# Patient Record
Sex: FEMALE | Race: WHITE | Employment: UNEMPLOYED | URBAN - METROPOLITAN AREA
[De-identification: names, ages, dates, MRNs, and addresses within clinical notes are randomized per-mention and may not be internally consistent; named-entity substitution may affect disease eponyms.]

---
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
## 2023-02-16 ENCOUNTER — TELEMEDICINE (OUTPATIENT)
Dept: BEHAVIORAL/MENTAL HEALTH CLINIC | Age: 18
End: 2023-02-16

## 2023-02-16 DIAGNOSIS — F43.10 COMPLEX POSTTRAUMATIC STRESS DISORDER: Primary | ICD-10-CM

## 2023-02-16 DIAGNOSIS — F32.9 MAJOR DEPRESSIVE DISORDER WITHOUT PSYCHOTIC FEATURES: ICD-10-CM

## 2023-02-16 DIAGNOSIS — F33.1 MDD (MAJOR DEPRESSIVE DISORDER), RECURRENT EPISODE, MODERATE (HCC): ICD-10-CM

## 2023-02-16 RX ORDER — ESCITALOPRAM OXALATE 10 MG/1
10 TABLET ORAL DAILY
Qty: 30 TABLET | Refills: 2 | Status: SHIPPED | OUTPATIENT
Start: 2023-02-16

## 2023-02-16 ASSESSMENT — ANXIETY QUESTIONNAIRES
7. FEELING AFRAID AS IF SOMETHING AWFUL MIGHT HAPPEN: 0
3. WORRYING TOO MUCH ABOUT DIFFERENT THINGS: 0
5. BEING SO RESTLESS THAT IT IS HARD TO SIT STILL: 0
2. NOT BEING ABLE TO STOP OR CONTROL WORRYING: 0
1. FEELING NERVOUS, ANXIOUS, OR ON EDGE: 0
GAD7 TOTAL SCORE: 0
4. TROUBLE RELAXING: 0
6. BECOMING EASILY ANNOYED OR IRRITABLE: 0

## 2023-02-16 ASSESSMENT — PATIENT HEALTH QUESTIONNAIRE - PHQ9
SUM OF ALL RESPONSES TO PHQ QUESTIONS 1-9: 0
2. FEELING DOWN, DEPRESSED OR HOPELESS: 0
SUM OF ALL RESPONSES TO PHQ QUESTIONS 1-9: 0
SUM OF ALL RESPONSES TO PHQ QUESTIONS 1-9: 0
1. LITTLE INTEREST OR PLEASURE IN DOING THINGS: 0
SUM OF ALL RESPONSES TO PHQ9 QUESTIONS 1 & 2: 0
SUM OF ALL RESPONSES TO PHQ QUESTIONS 1-9: 0
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
## 2023-03-07 ENCOUNTER — HOSPITAL ENCOUNTER (EMERGENCY)
Age: 18
Discharge: HOME | End: 2023-03-07
Attending: EMERGENCY MEDICINE
Payer: MEDICAID

## 2023-03-07 VITALS
SYSTOLIC BLOOD PRESSURE: 123 MMHG | TEMPERATURE: 100 F | HEART RATE: 94 BPM | RESPIRATION RATE: 16 BRPM | OXYGEN SATURATION: 95 % | DIASTOLIC BLOOD PRESSURE: 75 MMHG

## 2023-03-07 DIAGNOSIS — J02.0 ACUTE STREPTOCOCCAL PHARYNGITIS: Primary | ICD-10-CM

## 2023-03-07 LAB
FLUAV RNA SPEC QL NAA+PROBE: NOT DETECTED
FLUBV RNA SPEC QL NAA+PROBE: NOT DETECTED
SARS-COV-2 RDRP RESP QL NAA+PROBE: NOT DETECTED
SOURCE: NORMAL
STREP, MOLECULAR: NOT DETECTED

## 2023-03-07 PROCEDURE — 6360000002 HC RX W HCPCS

## 2023-03-07 PROCEDURE — 96372 THER/PROPH/DIAG INJ SC/IM: CPT

## 2023-03-07 PROCEDURE — 6370000000 HC RX 637 (ALT 250 FOR IP)

## 2023-03-07 PROCEDURE — 87651 STREP A DNA AMP PROBE: CPT

## 2023-03-07 PROCEDURE — 87502 INFLUENZA DNA AMP PROBE: CPT

## 2023-03-07 PROCEDURE — 99284 EMERGENCY DEPT VISIT MOD MDM: CPT

## 2023-03-07 PROCEDURE — 87635 SARS-COV-2 COVID-19 AMP PRB: CPT

## 2023-03-07 RX ORDER — DEXAMETHASONE SODIUM PHOSPHATE 10 MG/ML
10 INJECTION INTRAMUSCULAR; INTRAVENOUS
Status: COMPLETED | OUTPATIENT
Start: 2023-03-07 | End: 2023-03-07

## 2023-03-07 RX ORDER — DEXAMETHASONE 6 MG/1
6 TABLET ORAL DAILY
Qty: 4 TABLET | Refills: 0 | Status: SHIPPED | OUTPATIENT
Start: 2023-03-07 | End: 2023-03-11

## 2023-03-07 RX ORDER — PENICILLIN V POTASSIUM 500 MG/1
500 TABLET ORAL 3 TIMES DAILY
Qty: 30 TABLET | Refills: 0 | Status: SHIPPED | OUTPATIENT
Start: 2023-03-07 | End: 2023-03-17

## 2023-03-07 RX ORDER — KETOROLAC TROMETHAMINE 30 MG/ML
30 INJECTION, SOLUTION INTRAMUSCULAR; INTRAVENOUS ONCE
Status: COMPLETED | OUTPATIENT
Start: 2023-03-07 | End: 2023-03-07

## 2023-03-07 RX ORDER — PENICILLIN V POTASSIUM 250 MG/1
500 TABLET ORAL
Status: COMPLETED | OUTPATIENT
Start: 2023-03-07 | End: 2023-03-07

## 2023-03-07 RX ADMIN — KETOROLAC TROMETHAMINE 30 MG: 30 INJECTION, SOLUTION INTRAMUSCULAR; INTRAVENOUS at 23:05

## 2023-03-07 RX ADMIN — DEXAMETHASONE SODIUM PHOSPHATE 10 MG: 10 INJECTION INTRAMUSCULAR; INTRAVENOUS at 23:05

## 2023-03-07 RX ADMIN — PENICILLIN V POTASIUM 500 MG: 250 TABLET ORAL at 23:05

## 2023-03-07 ASSESSMENT — ENCOUNTER SYMPTOMS
ABDOMINAL PAIN: 0
COLOR CHANGE: 0
NAUSEA: 1
SORE THROAT: 1
SHORTNESS OF BREATH: 0
DIARRHEA: 0
VOMITING: 0
TROUBLE SWALLOWING: 0

## 2023-03-08 NOTE — ED PROVIDER NOTES
Emergency Department Provider Note                   PCP:                Aung Gaitan MD               Age: 21 y.o. Sex: female     DISPOSITION Decision To Discharge 03/07/2023 10:10:22 PM       ICD-10-CM    1. Acute streptococcal pharyngitis  J02.0 penicillin v potassium (VEETID) 500 MG tablet     benzocaine-menthol (CEPACOL) 15-4 MG LOZG lozenge     dexamethasone (DECADRON) 6 MG tablet          MEDICAL DECISION MAKING  Complexity of Problems Addressed:  1 acute illness    Data Reviewed and Analyzed:  Category 1:   I reviewed records from an external source: provider visit notes from PCP. I reviewed records from an external source: provider visit notes from outside specialist.  I ordered each unique test.  I reviewed the results of each unique test.        Category 2:       I independently ordered and reviewed the labs    Category 3: Discussion of management or test interpretation. 78-year-old female presents for 2 days of fever, sore throat. States fevers are reducing with Tylenol. Denies any changes to phonation, inability to handle secretions. Patient with a temperature of 100 in triage with a heart rate of 111. Patient had just taken Tylenol 30 minutes prior to arrival.  We will recheck heart rate is likely elevation is due to elevated temperature. Rapid COVID-19, rapid influenza and rapid strep throat were obtained and were all negative    On physical exam patient has tonsillar edema with significant tonsillar exudate. Uvula is midline and she does have anterior chain cervical adenopathy. Although patient tested negative for strep pharyngitis, will treat her for strep pharyngitis with a course of penicillin. Patient's urine hCG is negative  Patient agreeable to IM injection of Toradol and IM injection of Decadron    Based on history, physical exam, work-up today in the emergency department, I do not feel additional lab work or imaging is warranted at this time.   No urgent or emergent findings. Patient is stable and can be discharged home with follow-up to primary care. Uvula is midline, normal phonation, no indication of peritonsillar abscess at this time. Patient does have a primary care provider with whom she can follow-up with as well. Patient will be discharged with a course of penicillin, Decadron and Cepacol lozenges    I discussed physical exam findings, treatment, follow-up with patient. Answered any questions that she had. Discussed signs and symptoms that would warrant a prompt return to the emergency department included these in discharge paperwork as well    Patient discharged home in stable condition she is to follow-up with primary care towards the end of the week/beginning of next week. History was obtained from the patient  Patient very involved in shared decision-making  Ordered and reviewed lab work today  No indication for imaging  No indication for EKG      ED Course as of 03/07/23 2247   Tue Mar 07, 2023   2130 Urine hCG negative [JG]    Rapid influenza negative  Rapid COVID-19 negative  Rapid strep negative [JG]      ED Course User Index  [JG] CHAIM Rachel       Risk of Complications and/or Morbidity of Patient Management:  Prescription drug management performed and Patient was discharged risks and benefits of hospitalization were considered     Is this patient to be included in the SEP-1 core measure due to severe sepsis or septic shock? No Exclusion criteria - the patient is NOT to be included for SEP-1 Core Measure due to: 2+ SIRS criteria are not met     Garcia Hoffman is a 21 y.o. female who presents to the Emergency Department with chief complaint of  No chief complaint on file. 75-year-old female with history of mild intermittent asthma,  section presents to the emergency department today with chief complaint of 2 days of sore throat with intermittent nausea. Patient states fever began yesterday. Endorses ear fullness. Denies ear pain, cough, vomiting, diarrhea, shortness of breath headache or chest pain. Patient states she has been taking Tylenol and fevers do reduce. States she is been using over-the-counter cough drops without sore throat relief. She has a history of recurrent strep throat. The history is provided by the patient. No  was used. Review of Systems   Constitutional:  Positive for fever. Negative for chills and fatigue. HENT:  Positive for sore throat. Negative for congestion, drooling, ear pain and trouble swallowing. Respiratory:  Negative for shortness of breath. Cardiovascular:  Negative for chest pain and palpitations. Gastrointestinal:  Positive for nausea. Negative for abdominal pain, diarrhea and vomiting. Skin:  Negative for color change. Neurological:  Negative for weakness and headaches. All other systems reviewed and are negative. Vitals signs and nursing note reviewed. Patient Vitals for the past 4 hrs:   Temp Pulse Resp BP SpO2   03/07/23 2208 -- 94 -- -- --   03/07/23 2118 100 °F (37.8 °C) (!) 111 16 123/75 95 %          Physical Exam  Vitals and nursing note reviewed. Constitutional:       General: She is not in acute distress. Appearance: Normal appearance. She is obese. She is not ill-appearing, toxic-appearing or diaphoretic. HENT:      Head: Atraumatic. Right Ear: Tympanic membrane, ear canal and external ear normal.      Left Ear: Tympanic membrane, ear canal and external ear normal.      Nose: Nose normal.      Mouth/Throat:      Lips: Pink. Mouth: Mucous membranes are moist.      Tongue: No lesions. Tongue does not deviate from midline. Palate: No mass and lesions. Pharynx: Uvula midline. Oropharyngeal exudate and posterior oropharyngeal erythema present. No pharyngeal swelling or uvula swelling. Tonsils: Tonsillar exudate and tonsillar abscess present. 2+ on the right. 2+ on the left.    Eyes:      Extraocular Movements: Extraocular movements intact. Conjunctiva/sclera: Conjunctivae normal.   Neck:      Trachea: Trachea and phonation normal. No abnormal tracheal secretions. Cardiovascular:      Rate and Rhythm: Normal rate. Pulses: Normal pulses. Heart sounds: Normal heart sounds. Pulmonary:      Effort: Pulmonary effort is normal.      Breath sounds: Normal breath sounds. Abdominal:      General: Bowel sounds are normal.      Palpations: Abdomen is soft. Tenderness: There is no abdominal tenderness. There is no guarding or rebound. Musculoskeletal:         General: Normal range of motion. Cervical back: Normal range of motion and neck supple. Right lower leg: No edema. Lymphadenopathy:      Cervical: Cervical adenopathy present. Skin:     General: Skin is warm and dry. Capillary Refill: Capillary refill takes less than 2 seconds. Neurological:      General: No focal deficit present. Mental Status: She is alert and oriented to person, place, and time. Psychiatric:         Mood and Affect: Mood normal.         Behavior: Behavior normal.         Thought Content:  Thought content normal.         Judgment: Judgment normal.        Procedures    ED Course as of 03/07/23 2247   Tue Mar 07, 2023   2130 Urine hCG negative [JG]   2208 Rapid influenza negative  Rapid COVID-19 negative  Rapid strep negative [JG]      ED Course User Index  [JG] CHAIM King        Orders Placed This Encounter   Procedures    Influenza A/B, Molecular    COVID-19, Rapid    Group A Strep Screen By PCR        Medications   ketorolac (TORADOL) injection 30 mg (has no administration in time range)   dexamethasone (DECADRON) injection 10 mg (has no administration in time range)   penicillin v potassium (VEETID) tablet 500 mg (has no administration in time range)       New Prescriptions    BENZOCAINE-MENTHOL (CEPACOL) 15-4 MG LOZG LOZENGE    Take 1 lozenge by mouth 6 times daily for 5 days DEXAMETHASONE (DECADRON) 6 MG TABLET    Take 1 tablet by mouth daily for 4 days    PENICILLIN V POTASSIUM (VEETID) 500 MG TABLET    Take 1 tablet by mouth 3 times daily for 10 days        Past Medical History:   Diagnosis Date    Allergic rhinitis     Anemia, antepartum 2020    Anxiety     Asthma, intermittent     Deliberate self-cutting     Depression     Encounter for immunization 2020    History of shoulder dystocia in prior pregnancy     HSV-1 infection     Hypertension     Inhalation injury 2018    Insomnia     Lactose intolerance     Oppositional defiant disorder     Joanna Alves    Orthostatic hypotension     Pre-eclampsia 2020    with first pregnancy    Rh negative state in antepartum period     Shoulder dystocia during labor and delivery         Past Surgical History:   Procedure Laterality Date     SECTION  2021    HEENT      Foreign body removal from right eye        Family History   Problem Relation Age of Onset    Migraines Maternal Aunt     Diabetes Maternal Aunt     Hypertension Maternal Aunt     Diabetes Brother     Diabetes Maternal Grandmother     Hypertension Maternal Grandmother     Diabetes Maternal Grandfather     High Cholesterol Other     Stroke Other     Heart Disease Other     Diabetes Father     Hypertension Maternal Grandfather     Coronary Art Dis Other         Social History     Socioeconomic History    Marital status: Single     Spouse name: None    Number of children: None    Years of education: None    Highest education level: None   Tobacco Use    Smoking status: Every Day     Packs/day: 1.00     Types: Cigarettes    Smokeless tobacco: Never   Vaping Use    Vaping Use: Never used   Substance and Sexual Activity    Alcohol use: Yes     Comment: ocassionally    Drug use: Yes     Types: Marijuana Fred Mcgee        Allergies:  Other    Previous Medications    CLOTRIMAZOLE-BETAMETHASONE (LOTRISONE) 1-0.05 % CREAM    Apply topically 2 times daily. ESCITALOPRAM (LEXAPRO) 10 MG TABLET    Take 1 tablet by mouth daily    ESTRADIOL (ESTRACE) 1 MG TABLET    1 mg sublinqual and/or po qd x 4-8 weeks for Nexplanon BTB. Discontinue Estrogen after 1-2 m of amenorrhea to re-assess bleeding pattern. ETONOGESTREL (NEXPLANON) 68 MG IMPLANT    68 mg by Subdermal route once    HYDROCORTISONE (PROCTOZONE-HC) 2.5 % CREA RECTAL CREAM    Place rectally 2 times daily    MELATONIN 3 MG TABS TABLET    Take 3 mg by mouth daily    NONFORMULARY        NONFORMULARY        TRIAMCINOLONE (KENALOG) 0.025 % OINTMENT    Apply topically 2 times daily. Results for orders placed or performed during the hospital encounter of 03/07/23   Influenza A/B, Molecular    Specimen: Nasal   Result Value Ref Range    Influenza A, TY Not detected      Influenza B, TY Not detected     COVID-19, Rapid    Specimen: Nasopharyngeal   Result Value Ref Range    Source Nasopharyngeal      SARS-CoV-2, Rapid Not detected NOTD     Group A Strep Screen By PCR    Specimen: Throat   Result Value Ref Range    Strep, Molecular Not detected          No orders to display                     Voice dictation software was used during the making of this note. This software is not perfect and grammatical and other typographical errors may be present. This note has not been completely proofread for errors.       Nathalie Luciano, [address]  03/07/23 0285

## 2023-03-08 NOTE — ED NOTES
I have reviewed discharge instructions with the patient. The patient verbalized understanding. Patient left ED via Discharge Method: ambulatory to Home with self. Opportunity for questions and clarification provided. Patient given 3 scripts. To continue your aftercare when you leave the hospital, you may receive an automated call from our care team to check in on how you are doing. This is a free service and part of our promise to provide the best care and service to meet your aftercare needs.  If you have questions, or wish to unsubscribe from this service please call [phone]. Thank you for Choosing our [address] [place] [address] Emergency Department.         Halle Wray RN  03/07/23 8245

## 2023-03-08 NOTE — DISCHARGE INSTRUCTIONS
You were evaluated in the emergency department today for sore throat    Physical exam is consistent with strep pharyngitis  you were given a dose of penicillin here in the emergency department  You are given injection of Toradol which is an anti-inflammatory here in the emergency department   you were given a dose of steroid here in the emergency department    I have written you a prescription for penicillin to be started tomorrow  I have written you prescription for steroid to be started tomorrow  I have written you a prescription for lozenges will help with sore throat    Contact your primary care provider tomorrow to schedule follow-up towards the end of the week beginning of next week    Return to the emergency department if you have been on antibiotics for 3 days and fevers or not reducing with Tylenol or Motrin, you are unable to handle your secretions (drooling)    Recommend alternating Tylenol with Motrin every 4 hours for fever reduction and pain relief

## 2023-04-04 ENCOUNTER — APPOINTMENT (OUTPATIENT)
Dept: GENERAL RADIOLOGY | Age: 18
End: 2023-04-04
Payer: MEDICAID

## 2023-04-04 ENCOUNTER — HOSPITAL ENCOUNTER (EMERGENCY)
Age: 18
Discharge: HOME | End: 2023-04-04
Attending: EMERGENCY MEDICINE
Payer: MEDICAID

## 2023-04-04 ENCOUNTER — APPOINTMENT (OUTPATIENT)
Dept: CT IMAGING | Age: 18
End: 2023-04-04
Payer: MEDICAID

## 2023-04-04 VITALS
OXYGEN SATURATION: 96 % | SYSTOLIC BLOOD PRESSURE: 133 MMHG | HEIGHT: 65 IN | BODY MASS INDEX: 28.32 KG/M2 | WEIGHT: 170 LBS | RESPIRATION RATE: 18 BRPM | HEART RATE: 109 BPM | TEMPERATURE: 98.5 F | DIASTOLIC BLOOD PRESSURE: 79 MMHG

## 2023-04-04 DIAGNOSIS — S90.32XA CONTUSION OF LEFT FOOT, INITIAL ENCOUNTER: ICD-10-CM

## 2023-04-04 DIAGNOSIS — S00.83XA FACIAL CONTUSION, INITIAL ENCOUNTER: ICD-10-CM
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
## 2023-04-04 DIAGNOSIS — S16.1XXA CERVICAL STRAIN, ACUTE, INITIAL ENCOUNTER: ICD-10-CM

## 2023-04-04 PROCEDURE — 72125 CT NECK SPINE W/O DYE: CPT

## 2023-04-04 PROCEDURE — 99284 EMERGENCY DEPT VISIT MOD MDM: CPT

## 2023-04-04 PROCEDURE — 70486 CT MAXILLOFACIAL W/O DYE: CPT

## 2023-04-04 PROCEDURE — 73630 X-RAY EXAM OF FOOT: CPT

## 2023-04-04 PROCEDURE — 6370000000 HC RX 637 (ALT 250 FOR IP): Performed by: PHYSICIAN ASSISTANT

## 2023-04-04 RX ORDER — METHOCARBAMOL 500 MG/1
500 TABLET, FILM COATED ORAL 3 TIMES DAILY PRN
Qty: 40 TABLET | Refills: 0 | Status: SHIPPED | OUTPATIENT
Start: 2023-04-04 | End: 2023-04-14

## 2023-04-04 RX ORDER — LIDOCAINE 4 G/G
1 PATCH TOPICAL
Status: DISCONTINUED | OUTPATIENT
Start: 2023-04-04 | End: 2023-04-04 | Stop reason: HOSPADM

## 2023-04-04 RX ORDER — BUTALBITAL, ACETAMINOPHEN AND CAFFEINE 50; 325; 40 MG/1; MG/1; MG/1
2 TABLET ORAL
Status: COMPLETED | OUTPATIENT
Start: 2023-04-04 | End: 2023-04-04

## 2023-04-04 RX ORDER — DICLOFENAC POTASSIUM 50 MG/1
50 TABLET, FILM COATED ORAL 3 TIMES DAILY PRN
Qty: 30 TABLET | Refills: 0 | Status: SHIPPED | OUTPATIENT
Start: 2023-04-04 | End: 2023-09-13

## 2023-04-04 RX ADMIN — BUTALBITAL, ACETAMINOPHEN, AND CAFFEINE 2 TABLET: 50; 325; 40 TABLET ORAL at 20:17

## 2023-04-04 ASSESSMENT — PAIN - FUNCTIONAL ASSESSMENT: PAIN_FUNCTIONAL_ASSESSMENT: 0-10

## 2023-04-04 ASSESSMENT — PAIN SCALES - GENERAL: PAINLEVEL_OUTOF10: 3

## 2023-04-12 ENCOUNTER — TELEMEDICINE (OUTPATIENT)
Dept: BEHAVIORAL/MENTAL HEALTH CLINIC | Age: 18
End: 2023-04-12

## 2023-04-12 DIAGNOSIS — F43.10 COMPLEX POSTTRAUMATIC STRESS DISORDER: Primary | ICD-10-CM

## 2023-04-12 DIAGNOSIS — F33.1 MDD (MAJOR DEPRESSIVE DISORDER), RECURRENT EPISODE, MODERATE (HCC): ICD-10-CM

## 2023-04-12 ASSESSMENT — PATIENT HEALTH QUESTIONNAIRE - PHQ9
4. FEELING TIRED OR HAVING LITTLE ENERGY: 3
5. POOR APPETITE OR OVEREATING: 0
1. LITTLE INTEREST OR PLEASURE IN DOING THINGS: 0
SUM OF ALL RESPONSES TO PHQ QUESTIONS 1-9: 9
SUM OF ALL RESPONSES TO PHQ QUESTIONS 1-9: 9
SUM OF ALL RESPONSES TO PHQ9 QUESTIONS 1 & 2: 0
SUM OF ALL RESPONSES TO PHQ QUESTIONS 1-9: 9
6. FEELING BAD ABOUT YOURSELF - OR THAT YOU ARE A FAILURE OR HAVE LET YOURSELF OR YOUR FAMILY DOWN: 0
8. MOVING OR SPEAKING SO SLOWLY THAT OTHER PEOPLE COULD HAVE NOTICED. OR THE OPPOSITE, BEING SO FIGETY OR RESTLESS THAT YOU HAVE BEEN MOVING AROUND A LOT MORE THAN USUAL: 0
2. FEELING DOWN, DEPRESSED OR HOPELESS: 0
7. TROUBLE CONCENTRATING ON THINGS, SUCH AS READING THE NEWSPAPER OR WATCHING TELEVISION: 3
9. THOUGHTS THAT YOU WOULD BE BETTER OFF DEAD, OR OF HURTING YOURSELF: 0
3. TROUBLE FALLING OR STAYING ASLEEP: 3
SUM OF ALL RESPONSES TO PHQ QUESTIONS 1-9: 9
10. IF YOU CHECKED OFF ANY PROBLEMS, HOW DIFFICULT HAVE THESE PROBLEMS MADE IT FOR YOU TO DO YOUR WORK, TAKE CARE OF THINGS AT HOME, OR GET ALONG WITH OTHER PEOPLE: 1

## 2023-04-28 ENCOUNTER — HOSPITAL ENCOUNTER (EMERGENCY)
Age: 18
Discharge: HOME | End: 2023-04-28
Attending: EMERGENCY MEDICINE
Payer: MEDICAID

## 2023-04-28 VITALS
HEIGHT: 65 IN | WEIGHT: 175 LBS | RESPIRATION RATE: 16 BRPM | OXYGEN SATURATION: 97 % | HEART RATE: 93 BPM | SYSTOLIC BLOOD PRESSURE: 129 MMHG | TEMPERATURE: 98.6 F | BODY MASS INDEX: 29.16 KG/M2 | DIASTOLIC BLOOD PRESSURE: 85 MMHG

## 2023-04-28 DIAGNOSIS — N76.0 BACTERIAL VAGINOSIS: Primary | ICD-10-CM

## 2023-04-28 DIAGNOSIS — B96.89 BACTERIAL VAGINOSIS: Primary | ICD-10-CM

## 2023-04-28 LAB
SERVICE CMNT-IMP: NORMAL
WET PREP GENITAL: NORMAL

## 2023-04-28 PROCEDURE — 87491 CHLMYD TRACH DNA AMP PROBE: CPT

## 2023-04-28 PROCEDURE — 99283 EMERGENCY DEPT VISIT LOW MDM: CPT

## 2023-04-28 PROCEDURE — 87591 N.GONORRHOEAE DNA AMP PROB: CPT

## 2023-04-28 PROCEDURE — 87210 SMEAR WET MOUNT SALINE/INK: CPT

## 2023-04-28 RX ORDER — METRONIDAZOLE 500 MG/1
500 TABLET ORAL 2 TIMES DAILY
Qty: 14 TABLET | Refills: 0 | Status: SHIPPED | OUTPATIENT
Start: 2023-04-28 | End: 2023-05-05

## 2023-04-28 ASSESSMENT — LIFESTYLE VARIABLES: HOW OFTEN DO YOU HAVE A DRINK CONTAINING ALCOHOL: 2-3 TIMES A WEEK

## 2023-04-28 NOTE — ED PROVIDER NOTES
Emergency Department Provider Note       PCP: Love Merritt MD   Age: 19 y.o. Sex: female     DISPOSITION Decision To Discharge 04/28/2023 01:31:29 AM       ICD-10-CM    1. Bacterial vaginosis  N76.0     B96.89           Medical Decision Making     Complexity of Problems Addressed:  Complexity of Problem: 1 acute, uncomplicated illness or injury. Data Reviewed and Analyzed:  Category 1:   I independently ordered and reviewed each unique test.         Category 2:       Category 3: Discussion of management or test interpretation. Urinalysis is negative for evidence of infection pregnancy test is also negative. Wet prep is positive for numerous clue cells. As the patient is afebrile and normal vital signs and nontoxic in appearance will treat for bacterial vaginosis. Risk of Complications and/or Morbidity of Patient Management:      History     Arva Barthel is a 19 y.o. female who presents to the Emergency Department with chief complaint of    Chief Complaint   Patient presents with    Dysuria      Patient presents with 24 to 36-hour history of sharp stabbing suprapubic abdominal pain that radiates to the right and the left. Pain is worse with movement and better when remaining still. She reports an unusual and heavy vaginal discharge for the past 4 days. She denies dysuria or hematuria. She denies any fever or chills or flank pain. The history is provided by the patient. Physical Exam     Vitals signs and nursing note reviewed. Patient Vitals for the past 4 hrs:   Temp Pulse Resp BP SpO2   04/28/23 0025 98.6 °F (37 °C) 93 16 129/85 97 %        Physical Exam  Vitals reviewed. Constitutional:       General: She is not in acute distress. Appearance: Normal appearance. She is not ill-appearing, toxic-appearing or diaphoretic. Eyes:      Extraocular Movements: Extraocular movements intact.       Conjunctiva/sclera: Conjunctivae normal.      Pupils: Pupils are equal, round, and

## 2023-04-28 NOTE — ED NOTES
I have reviewed discharge instructions with the patient. The patient verbalized understanding. Patient left ED via Discharge Method: ambulatory to Home with family. Opportunity for questions and clarification provided. Patient given 0 scripts. Px sent to pharmacy          To continue your aftercare when you leave the hospital, you may receive an automated call from our care team to check in on how you are doing. This is a free service and part of our promise to provide the best care and service to meet your aftercare needs.  If you have questions, or wish to unsubscribe from this service please call [phone]. Thank you for Choosing our [facility] Emergency Department.         Harrison Betancorut, RN  04/28/23 [address], RN  04/28/23 1008

## 2023-05-01 LAB
C TRACH RRNA SPEC QL NAA+PROBE: NEGATIVE
N GONORRHOEA RRNA SPEC QL NAA+PROBE: NEGATIVE
SPECIMEN SOURCE: NORMAL

## 2023-09-13 ENCOUNTER — HOSPITAL ENCOUNTER (EMERGENCY)
Age: 18
Discharge: HOME | End: 2023-09-13
Attending: STUDENT IN AN ORGANIZED HEALTH CARE EDUCATION/TRAINING PROGRAM
Payer: MEDICAID

## 2023-09-13 VITALS
RESPIRATION RATE: 16 BRPM | BODY MASS INDEX: 25.83 KG/M2 | DIASTOLIC BLOOD PRESSURE: 87 MMHG | HEIGHT: 65 IN | HEART RATE: 116 BPM | TEMPERATURE: 99.1 F | OXYGEN SATURATION: 100 % | SYSTOLIC BLOOD PRESSURE: 123 MMHG | WEIGHT: 155 LBS

## 2023-09-13 DIAGNOSIS — N76.0 BACTERIAL VAGINOSIS: Primary | ICD-10-CM

## 2023-09-13 DIAGNOSIS — J06.9 UPPER RESPIRATORY TRACT INFECTION, UNSPECIFIED TYPE: ICD-10-CM

## 2023-09-13 DIAGNOSIS — B96.89 BACTERIAL VAGINOSIS: Primary | ICD-10-CM

## 2023-09-13 LAB
APPEARANCE UR: CLEAR
BACTERIA URNS QL MICRO: ABNORMAL /HPF
BILIRUB UR QL: NEGATIVE
COLOR UR: ABNORMAL
EPI CELLS #/AREA URNS HPF: ABNORMAL /HPF
FLUAV RNA SPEC QL NAA+PROBE: NOT DETECTED
FLUBV RNA SPEC QL NAA+PROBE: NOT DETECTED
GLUCOSE UR STRIP.AUTO-MCNC: NEGATIVE MG/DL
HCG UR QL: NEGATIVE
HGB UR QL STRIP: NEGATIVE
KETONES UR QL STRIP.AUTO: NEGATIVE MG/DL
LEUKOCYTE ESTERASE UR QL STRIP.AUTO: ABNORMAL
NITRITE UR QL STRIP.AUTO: NEGATIVE
PH UR STRIP: 7.5 (ref 5–9)
PROT UR STRIP-MCNC: NEGATIVE MG/DL
SARS-COV-2 RDRP RESP QL NAA+PROBE: NOT DETECTED
SERVICE CMNT-IMP: NORMAL
SOURCE: NORMAL
SP GR UR REFRACTOMETRY: 1.02 (ref 1–1.02)
STREP, MOLECULAR: NOT DETECTED
UROBILINOGEN UR QL STRIP.AUTO: 1 EU/DL (ref 0.2–1)
WBC URNS QL MICRO: ABNORMAL /HPF
WET PREP GENITAL: NORMAL

## 2023-09-13 PROCEDURE — 87502 INFLUENZA DNA AMP PROBE: CPT

## 2023-09-13 PROCEDURE — 87591 N.GONORRHOEAE DNA AMP PROB: CPT

## 2023-09-13 PROCEDURE — 99284 EMERGENCY DEPT VISIT MOD MDM: CPT

## 2023-09-13 PROCEDURE — 2580000003 HC RX 258: Performed by: STUDENT IN AN ORGANIZED HEALTH CARE EDUCATION/TRAINING PROGRAM

## 2023-09-13 PROCEDURE — 81001 URINALYSIS AUTO W/SCOPE: CPT

## 2023-09-13 PROCEDURE — 6360000002 HC RX W HCPCS: Performed by: STUDENT IN AN ORGANIZED HEALTH CARE EDUCATION/TRAINING PROGRAM

## 2023-09-13 PROCEDURE — 87635 SARS-COV-2 COVID-19 AMP PRB: CPT

## 2023-09-13 PROCEDURE — 6370000000 HC RX 637 (ALT 250 FOR IP): Performed by: STUDENT IN AN ORGANIZED HEALTH CARE EDUCATION/TRAINING PROGRAM

## 2023-09-13 PROCEDURE — 87491 CHLMYD TRACH DNA AMP PROBE: CPT

## 2023-09-13 PROCEDURE — 81025 URINE PREGNANCY TEST: CPT

## 2023-09-13 PROCEDURE — 96372 THER/PROPH/DIAG INJ SC/IM: CPT

## 2023-09-13 PROCEDURE — 87651 STREP A DNA AMP PROBE: CPT

## 2023-09-13 PROCEDURE — 87210 SMEAR WET MOUNT SALINE/INK: CPT

## 2023-09-13 RX ORDER — METRONIDAZOLE 500 MG/1
500 TABLET ORAL 2 TIMES DAILY
Qty: 14 TABLET | Refills: 0 | Status: SHIPPED | OUTPATIENT
Start: 2023-09-13 | End: 2023-09-20

## 2023-09-13 RX ORDER — DIPHENHYDRAMINE HCL 25 MG
50 CAPSULE ORAL
Status: DISCONTINUED | OUTPATIENT
Start: 2023-09-13 | End: 2023-09-13 | Stop reason: HOSPADM

## 2023-09-13 RX ORDER — DEXAMETHASONE SODIUM PHOSPHATE 10 MG/ML
10 INJECTION INTRAMUSCULAR; INTRAVENOUS ONCE
Status: DISCONTINUED | OUTPATIENT
Start: 2023-09-13 | End: 2023-09-13

## 2023-09-13 RX ORDER — LANOLIN ALCOHOL/MO/W.PET/CERES
6 CREAM (GRAM) TOPICAL
Status: COMPLETED | OUTPATIENT
Start: 2023-09-13 | End: 2023-09-13

## 2023-09-13 RX ORDER — AZITHROMYCIN 250 MG/1
2000 TABLET, FILM COATED ORAL
Status: COMPLETED | OUTPATIENT
Start: 2023-09-13 | End: 2023-09-13

## 2023-09-13 RX ORDER — CEPHALEXIN 500 MG/1
500 CAPSULE ORAL 2 TIMES DAILY
Qty: 14 CAPSULE | Refills: 0 | Status: SHIPPED | OUTPATIENT
Start: 2023-09-13 | End: 2023-09-20

## 2023-09-13 RX ORDER — IBUPROFEN 800 MG/1
800 TABLET ORAL EVERY 6 HOURS PRN
Qty: 20 TABLET | Refills: 0 | Status: SHIPPED | OUTPATIENT
Start: 2023-09-13

## 2023-09-13 RX ORDER — IBUPROFEN 800 MG/1
800 TABLET ORAL
Status: COMPLETED | OUTPATIENT
Start: 2023-09-13 | End: 2023-09-13

## 2023-09-13 RX ADMIN — AZITHROMYCIN DIHYDRATE 2000 MG: 250 TABLET ORAL at 03:41

## 2023-09-13 RX ADMIN — WATER 500 MG: 1 INJECTION INTRAMUSCULAR; INTRAVENOUS; SUBCUTANEOUS at 03:40

## 2023-09-13 RX ADMIN — IBUPROFEN 800 MG: 800 TABLET, FILM COATED ORAL at 01:23

## 2023-09-13 RX ADMIN — Medication 6 MG: at 03:57

## 2023-09-13 ASSESSMENT — LIFESTYLE VARIABLES
HOW MANY STANDARD DRINKS CONTAINING ALCOHOL DO YOU HAVE ON A TYPICAL DAY: 5 OR 6
HOW OFTEN DO YOU HAVE A DRINK CONTAINING ALCOHOL: 2-3 TIMES A WEEK

## 2023-09-13 ASSESSMENT — ENCOUNTER SYMPTOMS
SORE THROAT: 1
COUGH: 0
SHORTNESS OF BREATH: 0

## 2023-09-13 ASSESSMENT — PAIN DESCRIPTION - LOCATION: LOCATION: HEAD

## 2023-09-13 ASSESSMENT — PAIN - FUNCTIONAL ASSESSMENT: PAIN_FUNCTIONAL_ASSESSMENT: 0-10

## 2023-09-13 ASSESSMENT — PAIN SCALES - GENERAL
PAINLEVEL_OUTOF10: 5
PAINLEVEL_OUTOF10: 7

## 2023-09-13 NOTE — ED PROVIDER NOTES
Emergency Department Provider Note       PCP: Tori Bains MD   Age: 19 y.o. Sex: female     DISPOSITION Discharge - Pending Orders Complete 09/13/2023 03:23:35 AM       ICD-10-CM    1. Bacterial vaginosis  N76.0     B96.89       2. Upper respiratory tract infection, unspecified type  J06.9           Medical Decision Making     Complexity of Problems Addressed:  1 or more acute illnesses that pose a threat to life or bodily function. Data Reviewed and Analyzed:  I independently ordered and reviewed each unique test.  I reviewed external records: ED visit note from an outside group. I reviewed external records: provider visit note from PCP. I reviewed external records: provider visit note from outside specialist.   The patients assessment required an independent historian: Patient's mother at bedside. The reason they were needed is important historical information not provided by the patient. Discussion of management or test interpretation. 78-year-old female patient presenting with headache, fever, sore throat as well as concerns for vaginal discharge. We will check for flu, COVID and strep as patient denies any cough. Her lungs are clear on my assessment. No indication for x-ray. I will obtain GC and wet prep. She is requesting to perform self swabs which she has done in the past.  Urinalysis has been ordered as well. Urinalysis consistent with UTI, wet prep results show evidence of significant white cells and clue cells present. Given patient's reports of vaginal discharge following sexual intercourse, we will prophylactically treat for chlamydia and gonorrhea. Advised patient to avoid sexual contact until symptoms have resolved and antibiotics have been completed. COVID, flu and strep are negative. Patient requesting something to help her sleep. Offered Benadryl, reports history of Benadryl overdose. Will give dose of melatonin prior to discharge.       Risk of

## 2023-09-13 NOTE — DISCHARGE INSTRUCTIONS
As discussed you have been treated for the most common causes of sexually transmitted infections as a precaution. Culture results should be available for review in the next 2 to 3 days. Avoid all sexual contact until symptoms are resolved, antibiotics completed and cultures are resulted. Treat fever and headache with Tylenol or the Motrin prescribed. Take the entire course of antibiotics as prescribed. Return for worsening symptoms, concerns or questions. Avoid use of alcohol while taking Flagyl.

## 2023-09-13 NOTE — ED TRIAGE NOTES
Pt states she has had a headache with sinus pressure and fevers for approx 4 days. Has gotten worse in last 2 days.

## 2023-09-20 ENCOUNTER — HOSPITAL ENCOUNTER (EMERGENCY)
Age: 18
Discharge: HOME | End: 2023-09-20
Attending: EMERGENCY MEDICINE
Payer: MEDICAID

## 2023-09-20 VITALS
DIASTOLIC BLOOD PRESSURE: 86 MMHG | TEMPERATURE: 98.9 F | HEART RATE: 104 BPM | SYSTOLIC BLOOD PRESSURE: 111 MMHG | RESPIRATION RATE: 18 BRPM | OXYGEN SATURATION: 99 % | HEIGHT: 60 IN | WEIGHT: 155 LBS | BODY MASS INDEX: 30.43 KG/M2

## 2023-09-20 DIAGNOSIS — Z20.822 ENCOUNTER FOR LABORATORY TESTING FOR COVID-19 VIRUS: Primary | ICD-10-CM

## 2023-09-20 LAB
FLUAV RNA SPEC QL NAA+PROBE: NOT DETECTED
FLUBV RNA SPEC QL NAA+PROBE: NOT DETECTED
SARS-COV-2 RDRP RESP QL NAA+PROBE: NOT DETECTED
SOURCE: NORMAL

## 2023-09-20 PROCEDURE — 87635 SARS-COV-2 COVID-19 AMP PRB: CPT

## 2023-09-20 PROCEDURE — 99283 EMERGENCY DEPT VISIT LOW MDM: CPT

## 2023-09-20 PROCEDURE — 87502 INFLUENZA DNA AMP PROBE: CPT

## 2023-09-21 NOTE — ED PROVIDER NOTES
Capillary Refill: Capillary refill takes less than 2 seconds. Neurological:      General: No focal deficit present. Mental Status: She is alert and oriented to person, place, and time. Procedures     Procedures     Orders Placed This Encounter   Procedures    COVID-19, Rapid    Influenza A/B, Molecular        Medications given during this emergency department visit:  Medications - No data to display    New Prescriptions    No medications on file        Past Medical History:   Diagnosis Date    Allergic rhinitis     Anemia, antepartum 2020    Anxiety     Asthma, intermittent     Deliberate self-cutting     Depression     Encounter for immunization 2020    History of shoulder dystocia in prior pregnancy     HSV-1 infection     Hypertension     Inhalation injury 2018    Insomnia     Lactose intolerance     Oppositional defiant disorder     Vivienne Riojas    Orthostatic hypotension     Pre-eclampsia 2020    with first pregnancy    Rh negative state in antepartum period     Shoulder dystocia during labor and delivery         Past Surgical History:   Procedure Laterality Date     SECTION  2021    HEENT      Foreign body removal from right eye        Results for orders placed or performed during the hospital encounter of 21   COVID-19, Rapid    Specimen: Nasopharyngeal   Result Value Ref Range    Source NASAL SWAB      SARS-CoV-2, Rapid Not detected NOTD     Influenza A/B, Molecular    Specimen: Not Specified   Result Value Ref Range    Influenza A, TY Not detected NOTD      Influenza B, TY Not detected NOTD          No orders to display         Voice dictation software was used during the making of this note. This software is not perfect and grammatical and other typographical errors may be present. This note has not been completely proofread for errors.        MALLIKA Duggan - [place]  23 [phone]

## 2023-10-22 ENCOUNTER — HOSPITAL ENCOUNTER (EMERGENCY)
Age: 18
Discharge: HOME | End: 2023-10-23
Attending: EMERGENCY MEDICINE
Payer: MEDICAID

## 2023-10-22 ENCOUNTER — APPOINTMENT (OUTPATIENT)
Dept: GENERAL RADIOLOGY | Age: 18
End: 2023-10-22
Payer: MEDICAID

## 2023-10-22 VITALS
DIASTOLIC BLOOD PRESSURE: 75 MMHG | OXYGEN SATURATION: 95 % | HEART RATE: 81 BPM | SYSTOLIC BLOOD PRESSURE: 113 MMHG | RESPIRATION RATE: 18 BRPM | BODY MASS INDEX: 28.32 KG/M2 | TEMPERATURE: 98.4 F | WEIGHT: 170 LBS | HEIGHT: 65 IN

## 2023-10-22 DIAGNOSIS — K21.9 GASTROESOPHAGEAL REFLUX DISEASE WITHOUT ESOPHAGITIS: ICD-10-CM

## 2023-10-22 DIAGNOSIS — R07.89 ATYPICAL CHEST PAIN: Primary | ICD-10-CM

## 2023-10-22 LAB
ALBUMIN SERPL-MCNC: 3.7 G/DL (ref 3.5–5)
ALBUMIN/GLOB SERPL: 0.9 (ref 0.4–1.6)
ALP SERPL-CCNC: 76 U/L (ref 50–136)
ALT SERPL-CCNC: 24 U/L (ref 12–65)
ANION GAP SERPL CALC-SCNC: 7 MMOL/L (ref 2–11)
AST SERPL-CCNC: 11 U/L (ref 15–37)
BASOPHILS # BLD: 0.1 K/UL (ref 0–0.2)
BASOPHILS NFR BLD: 1 % (ref 0–2)
BILIRUB SERPL-MCNC: 0.2 MG/DL (ref 0.2–1.1)
BUN SERPL-MCNC: 14 MG/DL (ref 6–23)
CALCIUM SERPL-MCNC: 9.4 MG/DL (ref 8.3–10.4)
CHLORIDE SERPL-SCNC: 109 MMOL/L (ref 101–110)
CO2 SERPL-SCNC: 22 MMOL/L (ref 21–32)
CREAT SERPL-MCNC: 0.88 MG/DL (ref 0.6–1)
DIFFERENTIAL METHOD BLD: ABNORMAL
EOSINOPHIL # BLD: 0.5 K/UL (ref 0–0.8)
EOSINOPHIL NFR BLD: 4 % (ref 0.5–7.8)
ERYTHROCYTE [DISTWIDTH] IN BLOOD BY AUTOMATED COUNT: 12.7 % (ref 11.9–14.6)
GLOBULIN SER CALC-MCNC: 4.3 G/DL (ref 2.8–4.5)
GLUCOSE SERPL-MCNC: 106 MG/DL (ref 65–100)
HCG, URINE, POC: NEGATIVE
HCT VFR BLD AUTO: 40.4 % (ref 35.8–46.3)
HGB BLD-MCNC: 13.3 G/DL (ref 11.7–15.4)
IMM GRANULOCYTES # BLD AUTO: 0 K/UL (ref 0–0.5)
IMM GRANULOCYTES NFR BLD AUTO: 0 % (ref 0–5)
LIPASE SERPL-CCNC: 154 U/L (ref 73–393)
LYMPHOCYTES # BLD: 2.1 K/UL (ref 0.5–4.6)
LYMPHOCYTES NFR BLD: 18 % (ref 13–44)
Lab: NORMAL
MCH RBC QN AUTO: 28.1 PG (ref 26.1–32.9)
MCHC RBC AUTO-ENTMCNC: 32.9 G/DL (ref 31.4–35)
MCV RBC AUTO: 85.2 FL (ref 82–102)
MONOCYTES # BLD: 0.7 K/UL (ref 0.1–1.3)
MONOCYTES NFR BLD: 6 % (ref 4–12)
NEGATIVE QC PASS/FAIL: NORMAL
NEUTS SEG # BLD: 8 K/UL (ref 1.7–8.2)
NEUTS SEG NFR BLD: 70 % (ref 43–78)
NRBC # BLD: 0 K/UL (ref 0–0.2)
PLATELET # BLD AUTO: 326 K/UL (ref 150–450)
PMV BLD AUTO: 10.2 FL (ref 9.4–12.3)
POSITIVE QC PASS/FAIL: NORMAL
POTASSIUM SERPL-SCNC: 4.5 MMOL/L (ref 3.5–5.1)
PROT SERPL-MCNC: 8 G/DL (ref 6.3–8.2)
RBC # BLD AUTO: 4.74 M/UL (ref 4.05–5.2)
SODIUM SERPL-SCNC: 138 MMOL/L (ref 133–143)
TROPONIN I SERPL HS-MCNC: 4.4 PG/ML (ref 0–14)
WBC # BLD AUTO: 11.4 K/UL (ref 4.3–11.1)

## 2023-10-22 PROCEDURE — 71045 X-RAY EXAM CHEST 1 VIEW: CPT

## 2023-10-22 PROCEDURE — 83690 ASSAY OF LIPASE: CPT

## 2023-10-22 PROCEDURE — 85025 COMPLETE CBC W/AUTO DIFF WBC: CPT

## 2023-10-22 PROCEDURE — 99285 EMERGENCY DEPT VISIT HI MDM: CPT

## 2023-10-22 PROCEDURE — 84484 ASSAY OF TROPONIN QUANT: CPT

## 2023-10-22 PROCEDURE — 80053 COMPREHEN METABOLIC PANEL: CPT

## 2023-10-22 PROCEDURE — 6370000000 HC RX 637 (ALT 250 FOR IP): Performed by: EMERGENCY MEDICINE

## 2023-10-22 RX ORDER — MAGNESIUM HYDROXIDE/ALUMINUM HYDROXICE/SIMETHICONE 120; 1200; 1200 MG/30ML; MG/30ML; MG/30ML
30 SUSPENSION ORAL
Status: COMPLETED | OUTPATIENT
Start: 2023-10-22 | End: 2023-10-22

## 2023-10-22 RX ORDER — LIDOCAINE HYDROCHLORIDE 20 MG/ML
15 SOLUTION OROPHARYNGEAL
Status: COMPLETED | OUTPATIENT
Start: 2023-10-22 | End: 2023-10-22

## 2023-10-22 RX ADMIN — ALUMINUM HYDROXIDE, MAGNESIUM HYDROXIDE, DIMETHICONE 30 ML: 200; 200; 20 LIQUID ORAL at 23:48

## 2023-10-22 RX ADMIN — LIDOCAINE HYDROCHLORIDE 15 ML: 20 SOLUTION ORAL at 23:48

## 2023-10-22 ASSESSMENT — ENCOUNTER SYMPTOMS
VOICE CHANGE: 0
VOMITING: 0
NAUSEA: 1
BACK PAIN: 0
EYE REDNESS: 0
SHORTNESS OF BREATH: 0
COLOR CHANGE: 0
ABDOMINAL PAIN: 0
CHEST TIGHTNESS: 1

## 2023-10-22 ASSESSMENT — LIFESTYLE VARIABLES
HOW OFTEN DO YOU HAVE A DRINK CONTAINING ALCOHOL: NEVER
HOW MANY STANDARD DRINKS CONTAINING ALCOHOL DO YOU HAVE ON A TYPICAL DAY: PATIENT DOES NOT DRINK

## 2023-10-22 ASSESSMENT — PAIN SCALES - GENERAL: PAINLEVEL_OUTOF10: 6

## 2023-10-22 ASSESSMENT — PAIN - FUNCTIONAL ASSESSMENT: PAIN_FUNCTIONAL_ASSESSMENT: NONE - DENIES PAIN

## 2023-10-23 RX ORDER — CYCLOBENZAPRINE HCL 10 MG
10 TABLET ORAL 3 TIMES DAILY PRN
Qty: 13 TABLET | Refills: 0 | Status: SHIPPED | OUTPATIENT
Start: 2023-10-23 | End: 2023-11-02

## 2023-10-23 RX ORDER — OMEPRAZOLE 40 MG/1
40 CAPSULE, DELAYED RELEASE ORAL
Qty: 30 CAPSULE | Refills: 2 | Status: SHIPPED | OUTPATIENT
Start: 2023-10-23

## 2023-10-23 NOTE — ED PROVIDER NOTES
Emergency Department Provider Note       PCP: Heber Millan MD   Age: 19 y.o. Sex: female     DISPOSITION Decision To Discharge 10/23/2023 12:18:34 AM       ICD-10-CM    1. Atypical chest pain  R07.89       2. Gastroesophageal reflux disease without esophagitis  K21.9           Medical Decision Making     Complexity of Problems Addressed:  1 or more chronic illnesses with a severe exacerbation or progression. Data Reviewed and Analyzed:  I independently ordered and reviewed each unique test.  I reviewed external records: ED visit note from an outside group. I reviewed external records: provider visit note from PCP. I reviewed external records: previous EKG including cardiologist interpretation. I reviewed external records: previous lab results from outside ED. I independently interpreted the cardiac monitor rhythm strip normal sinus rhythm. I interpreted the X-rays no pneumothorax. EKG interpretation independent of cardiologist: Normal sinus rhythm, rate of 60, normal axis, no blocks, no gross ST segment elevation or depression noted. Discussion of management or test interpretation. Given age and well appearance low suspicion for any emergent pathology. Nevertheless given persistent pain will obtain labs as well as EKG. 12:20 AM EDT  Laboratory data here is reassuring. Chest x-ray is clear as well. Some improvement with GI cocktail here. Discussed with patient results of testing. Pain could be more gastrointestinal in nature. However she thinks pain is made worse with position and would mean it may be muscle skeletal in nature 2. Will place on PPI as well as Flexeril. Encourage PCP follow-up      Risk of Complications and/or Morbidity of Patient Management:  Prescription drug management performed. Shared medical decision making was utilized in creating the patients health plan today. History       Patient presents the ER with complaints of chest pain.   Patient

## 2023-10-23 NOTE — ED TRIAGE NOTES
Pt c/o pain in center of chest going towards upper abdominal area, and radiating to left breast.  The pain has been going on and off for the past 2 years. Stated was dx w/ panic attacked 2 yrs ago. Came to ER today b/c experienced chest pain while lying down today. Went away when she stood/sat up. Pt stated \"When ever it is at it's peak my left arm and left shoulder start hurting. \"

## 2023-11-01 ENCOUNTER — NURSE ONLY (OUTPATIENT)
Dept: FAMILY MEDICINE CLINIC | Facility: CLINIC | Age: 18
End: 2023-11-01
Payer: MEDICAID

## 2023-11-01 ENCOUNTER — TELEPHONE (OUTPATIENT)
Dept: FAMILY MEDICINE CLINIC | Facility: CLINIC | Age: 18
End: 2023-11-01

## 2023-11-01 DIAGNOSIS — Z23 ENCOUNTER FOR IMMUNIZATION: Primary | ICD-10-CM

## 2023-11-01 PROCEDURE — 90471 IMMUNIZATION ADMIN: CPT | Performed by: PHYSICIAN ASSISTANT

## 2023-11-01 PROCEDURE — 90674 CCIIV4 VAC NO PRSV 0.5 ML IM: CPT | Performed by: PHYSICIAN ASSISTANT

## 2023-11-02 ENCOUNTER — PATIENT MESSAGE (OUTPATIENT)
Dept: FAMILY MEDICINE CLINIC | Facility: CLINIC | Age: 18
End: 2023-11-02

## 2023-11-02 NOTE — TELEPHONE ENCOUNTER
[address] [place] [address]
Please provide her with the below list.    [facility]     [facility] - [phone]    [facility]  [address] [facility]  Will file insurance for [employer]  Ms. Luis No specializes in grief    [facility]  [phone] (24 hours daily)  Will file insurance for [employer] - Financial Reconciliation, Medicare, worker's compensation and [address] [facility]  [phone] (free, night and weekends available also)     The [address]  Locations in [place] and   (specializes in counseling for women and girls)     [facility] (located next door)  [address], [address]     Counseling Services of [address] [place] [address] Ave  [phone]     [address] [facility]  [phone]     The [address] [facility] (depression/stress, eating disorders, body image etc)  [phone]     [facility]  Guilherme Conley (grief, stress management, marriage counseling)  Shawn Mcbride (adolescents, school issues)  [phone]     [facility] (deals with history of sexual abuse and is free)  Address: [facility], [address] [facility]  [phone]     Gay Rivera  [phone] (bills some insurance)     [facility] (for families with addicted family members)  [email or web address] Ophelia Stover  [phone]  Takes medicare  Offers EMDR services (eye movement and desensitization and reprocessing treatment) -- very beneficial for history of traumatic events and PTSD     [address], [facility], 701  [facility]  Telephone:  [phone] of [facility]  [address] [facility] , [facility], [address]  [phone]     [facility]  [phone]  [address] [place], [address]  Address: [address] [facility], [address]  [phone]
Pt verbalized that she is wanting therapy.  She is wanting a referral to a psychologist
[address]   Address: [address], [place], 701  [facility]  [phone]        Eating Disorders:   The [facility]  [address], [place], 6[address] [facility]  [phone]     Palomino Kurt  [phone]  (specializes in eating disorders, including binge eating disorder)     Grief Counseling:  Ria Rubi   [phone]     The [address]  For Loss, Grief, Transition  Gisel Kern  [phone]     [facility] (for all hurts, habits and hangups - from addiction to abuse to depression, etc)  [address]  [address], [place], [address] [place] [address] Person: José Miguel Schneider   Contact Number: [phone]      [address] Abeba Mandujano, [place], [address] [place] [address] Person: Skip Malcolm   Contact Number: [phone]

## 2023-11-07 ENCOUNTER — TELEPHONE (OUTPATIENT)
Dept: FAMILY MEDICINE CLINIC | Facility: CLINIC | Age: 18
End: 2023-11-07

## 2023-11-07 NOTE — TELEPHONE ENCOUNTER
----- Message from Mindy Holloway sent at 11/6/2023  1:00 PM EST -----  Regarding: Re:  General Check up  Please verify that she isn't expecting a physical for her upcoming appt. If so, we may need to reschedule.      Thanks,   [employer]

## 2023-11-09 NOTE — TELEPHONE ENCOUNTER
It is unsure if these are covered. The patient will have to call the insurance to see if these are approved through absolute total care. Patient was informed of this.   She was informed to also see who is covered and call back with the provider/facility names

## 2024-01-02 PROBLEM — O99.320 DRUG USE AFFECTING PREGNANCY, ANTEPARTUM: Status: RESOLVED | Noted: 2020-05-06 | Resolved: 2024-01-02

## 2024-01-02 PROBLEM — T14.90XA INHALATION INJURY: Status: RESOLVED | Noted: 2018-04-23 | Resolved: 2024-01-02

## 2024-01-02 PROBLEM — Z34.80 PRENATAL CARE OF MULTIGRAVIDA, ANTEPARTUM: Status: RESOLVED | Noted: 2021-04-30 | Resolved: 2024-01-02

## 2024-01-02 PROBLEM — Z31.69 ENCOUNTER FOR PRECONCEPTION CONSULTATION: Status: RESOLVED | Noted: 2020-05-06 | Resolved: 2024-01-02

## 2024-05-02 ENCOUNTER — OFFICE VISIT (OUTPATIENT)
Dept: FAMILY MEDICINE CLINIC | Facility: CLINIC | Age: 19
End: 2024-05-02
Payer: MEDICAID

## 2024-05-02 VITALS
WEIGHT: 160.19 LBS | OXYGEN SATURATION: 97 % | TEMPERATURE: 97.9 F | HEIGHT: 65 IN | BODY MASS INDEX: 26.69 KG/M2 | HEART RATE: 91 BPM

## 2024-05-02 DIAGNOSIS — L20.82 FLEXURAL ECZEMA: ICD-10-CM

## 2024-05-02 DIAGNOSIS — N89.8 VAGINAL ODOR: Primary | ICD-10-CM

## 2024-05-02 DIAGNOSIS — J30.2 SEASONAL ALLERGIES: ICD-10-CM

## 2024-05-02 LAB
HCG, PREGNANCY, URINE, POC: NEGATIVE
VALID INTERNAL CONTROL, POC: YES

## 2024-05-02 PROCEDURE — 81025 URINE PREGNANCY TEST: CPT

## 2024-05-02 PROCEDURE — 99214 OFFICE O/P EST MOD 30 MIN: CPT

## 2024-05-02 RX ORDER — METRONIDAZOLE 500 MG/1
500 TABLET ORAL 2 TIMES DAILY
Qty: 14 TABLET | Refills: 0 | Status: SHIPPED | OUTPATIENT
Start: 2024-05-02 | End: 2024-05-09

## 2024-05-02 RX ORDER — TRIAMCINOLONE ACETONIDE 1 MG/G
OINTMENT TOPICAL 2 TIMES DAILY
Qty: 15 G | Refills: 1 | Status: SHIPPED | OUTPATIENT
Start: 2024-05-02 | End: 2024-05-09

## 2024-05-02 SDOH — ECONOMIC STABILITY: HOUSING INSECURITY
IN THE LAST 12 MONTHS, WAS THERE A TIME WHEN YOU DID NOT HAVE A STEADY PLACE TO SLEEP OR SLEPT IN A SHELTER (INCLUDING NOW)?: NO

## 2024-05-02 SDOH — ECONOMIC STABILITY: FOOD INSECURITY: WITHIN THE PAST 12 MONTHS, THE FOOD YOU BOUGHT JUST DIDN'T LAST AND YOU DIDN'T HAVE MONEY TO GET MORE.: NEVER TRUE

## 2024-05-02 SDOH — ECONOMIC STABILITY: INCOME INSECURITY: HOW HARD IS IT FOR YOU TO PAY FOR THE VERY BASICS LIKE FOOD, HOUSING, MEDICAL CARE, AND HEATING?: NOT HARD AT ALL

## 2024-05-02 SDOH — ECONOMIC STABILITY: FOOD INSECURITY: WITHIN THE PAST 12 MONTHS, YOU WORRIED THAT YOUR FOOD WOULD RUN OUT BEFORE YOU GOT MONEY TO BUY MORE.: NEVER TRUE

## 2024-05-02 ASSESSMENT — PATIENT HEALTH QUESTIONNAIRE - PHQ9
3. TROUBLE FALLING OR STAYING ASLEEP: MORE THAN HALF THE DAYS
SUM OF ALL RESPONSES TO PHQ QUESTIONS 1-9: 8
5. POOR APPETITE OR OVEREATING: MORE THAN HALF THE DAYS
6. FEELING BAD ABOUT YOURSELF - OR THAT YOU ARE A FAILURE OR HAVE LET YOURSELF OR YOUR FAMILY DOWN: NOT AT ALL
SUM OF ALL RESPONSES TO PHQ QUESTIONS 1-9: 8
SUM OF ALL RESPONSES TO PHQ QUESTIONS 1-9: 8
8. MOVING OR SPEAKING SO SLOWLY THAT OTHER PEOPLE COULD HAVE NOTICED. OR THE OPPOSITE, BEING SO FIGETY OR RESTLESS THAT YOU HAVE BEEN MOVING AROUND A LOT MORE THAN USUAL: NOT AT ALL
1. LITTLE INTEREST OR PLEASURE IN DOING THINGS: NOT AT ALL
SUM OF ALL RESPONSES TO PHQ9 QUESTIONS 1 & 2: 0
2. FEELING DOWN, DEPRESSED OR HOPELESS: NOT AT ALL
9. THOUGHTS THAT YOU WOULD BE BETTER OFF DEAD, OR OF HURTING YOURSELF: NOT AT ALL
4. FEELING TIRED OR HAVING LITTLE ENERGY: MORE THAN HALF THE DAYS
7. TROUBLE CONCENTRATING ON THINGS, SUCH AS READING THE NEWSPAPER OR WATCHING TELEVISION: MORE THAN HALF THE DAYS
SUM OF ALL RESPONSES TO PHQ QUESTIONS 1-9: 8

## 2024-05-02 NOTE — PATIENT INSTRUCTIONS
Would recommend Zyrtec or Claritin as an antihistamine as well as Flonase nasal spray during the spring season.

## 2024-05-02 NOTE — PROGRESS NOTES
Calista Blanco (: 2005) is a 19 y.o. female, established patient, here for evaluation of the following chief complaint(s):  Other (Sinus issue, cough x1-2 wks. Vaginal odor for a while, thinks it may be related to nexplanon. Eczema )       ASSESSMENT/PLAN:  1. Vaginal odor  -     Nuswab Vaginitis Plus (VG+); Future  -     AMB POC URINE PREGNANCY TEST, VISUAL COLOR COMPARISON  -     metroNIDAZOLE (FLAGYL) 500 MG tablet; Take 1 tablet by mouth 2 times daily for 7 days, Disp-14 tablet, R-0Normal  -     Chlamydia, Gonorrhea, Trichomoniasis; Future  2. Seasonal allergies  3. Flexural eczema  -     triamcinolone (KENALOG) 0.1 % ointment; Apply topically 2 times daily for 7 days, Topical, 2 TIMES DAILY Starting Thu 2024, Until Thu 2024, For 7 days, Disp-15 g, R-1, Normal      SUBJECTIVE/OBJECTIVE:  HPI    Sinus pain: endorses nasal congestion with intermittent cough for 1-2 weeks. Recently moved from [place]. She denies fever, sinus pain/pressure, nasal discharge. Discussed starting an antihistamine and Flonase for allergies.     Vaginal odor: endorses vaginal odor for several weeks; endorses unprotected sexual intercourse. Describes color as white/gray. Obtaining urine pregnancy test, vaginal swab, GC/Chlamydia, Trich. Declines labwork for STD panel today.       Results for orders placed or performed in visit on 24   AMB POC URINE PREGNANCY TEST, VISUAL COLOR COMPARISON   Result Value Ref Range    Valid Internal Control, POC Yes     HCG, Pregnancy, Urine, POC Negative          Vitals:    24 1309   Pulse: 91   Temp: 97.9 °F (36.6 °C)   SpO2: 97%         Physical Exam  Constitutional:       Appearance: Normal appearance.   HENT:      Right Ear: Tympanic membrane and ear canal normal.      Left Ear: Tympanic membrane and ear canal normal.      Nose:      Right Sinus: No maxillary sinus tenderness or frontal sinus tenderness.      Left Sinus: No maxillary sinus tenderness or frontal sinus  DATE OF SERVICE: 05/18/2022

ADMIT DATE: 05/17/2022

ATTENDING PHYSICIAN:  Dr. Milner.



CHIEF COMPLAINT:  Fevers and syncopal episode.



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  She has had a 2-day history of 

fevers up to 102 degrees Fahrenheit, generalized headache, nausea, not feeling 

well.  She took some ibuprofen and Tylenol.  She had a negative COVID test.  She

is a nonsmoker, nondrinker.  She went to the Emergency Department.  She lives in

[place], but works in [employer].  She ended up at our hospital here.  She

had an extensive workup in the ED.  Her white count was elevated with 

leukocytosis 32,000.  Blood cultures have been sent off and are pending.  She 

was started on empiric vancomycin and Zosyn.  Her chest x-ray was clear.  Her CT

of the head showed no acute strokes or bleeds.  She is admitted then with fever 

of unknown origin and dehydration.



PAST MEDICAL HISTORY:  Significant for depression, hypertension.  She is not on 

any current meds.  She has had a previous cholecystectomy.



SOCIAL HISTORY:  She is a nonsmoker, nondrinker.  She is .  [place] 

[place].  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  No 

recent COVID exposure.  No tick bites.  No rashes.  No travel.



FAMILY HISTORY:  Noncontributory.



ALLERGIES:  She has no known drug allergies.



CURRENT MEDICATIONS:  None that I am aware of.



REVIEW OF SYSTEMS:  Again, significant for the fevers, some chills, lack of 

appetite.  No nausea, vomiting, diarrhea.  All other systems reviewed and turned

to be negative.



She does have chronic anemia.  She has been anemic ever since her child birth of

her 16-year-old.  She is .  She has 18-year-old daughter and a 

16-year-old son.



They tried administering a blood transfusion.  It made the fever worse and that 

was stopped.  She never got too much transfusion.  The hemoglobin this morning 

is 7.9 g/dL.



PHYSICAL EXAMINATION:

GENERAL:  When I saw her, this is a pleasant young female.

VITAL SIGNS:  Her initial vital signs showed a blood pressure 120/76, pulse is 

105 and regular, temperature T-max is 101.6 degrees Fahrenheit and her oxygen 

saturation 99% on room air.

HEENT:  Head is without trauma.  Pupils are reactive.  Sclerae is nonicteric.  

The oropharynx is clear.

NECK:  Supple.  No bruits identified.

LUNGS:  Good breath sounds.

CARDIOVASCULAR:  Showed regular heart tones.

ABDOMEN:  Soft.

EXTREMITIES:  Without edema.

NEUROLOGIC FINDINGS:  Focally intact.  Speech is fluent.

SKIN:  I examined her back, her skin and her abdomen.  I do not appreciate any 

rashes.  This skin is otherwise warm, but pale.



PERTINENT LABORATORY STUDIES:  Admission hemoglobin 7.9 g/dL, white count 

32,500.  Blood cultures are pending.  Chemistry panel shows sodium 137 mEq, 

potassium 3.6 mEq, nonfasting blood sugar 125 mg/dL, creatinine 0.8 mg/dL.  

Serology negative for influenza A, B and coronavirus.  Stool for occult blood 

was negative.  Urinalysis showed a specific gravity greater than 1.030, some 

protein, small amount of bilirubin, no bacteria.



IMAGING STUDIES:  As noted.



ASSESSMENT:

1.  A 43-year-old female with fever of unknown origin.

2.  Dehydration causing syncope.

3.  Anemia of chronic disease.

4.  Labile hypertension, currently normotensive.

5.  Previous cholecystectomy.



PLAN:

1.  Admit to the inpatient unit.

2.  Gentle IV hydration.

3.  Empiric antibiotics will continue pending cultures.

4.  Advance diet as tolerated.  She is stable clinically.  I believe that the 

elevated white count or leukocytosis is related to the underlying viral syndrome

causing her fevers.







MCKENNA

DR: Antonietta   DD: 05/18/2022 10:00

DT: 05/18/2022 10:23   TID: [id number]

## 2024-05-05 LAB
A VAGINAE DNA VAG QL NAA+PROBE: ABNORMAL SCORE
BVAB2 DNA VAG QL NAA+PROBE: ABNORMAL SCORE
C ALBICANS DNA VAG QL NAA+PROBE: NEGATIVE
C GLABRATA DNA VAG QL NAA+PROBE: NEGATIVE
C TRACH RRNA SPEC QL NAA+PROBE: NEGATIVE
C TRACH RRNA SPEC QL NAA+PROBE: NEGATIVE
MEGA1 DNA VAG QL NAA+PROBE: ABNORMAL SCORE
N GONORRHOEA RRNA SPEC QL NAA+PROBE: NEGATIVE
N GONORRHOEA RRNA SPEC QL NAA+PROBE: NEGATIVE
SPECIMEN SOURCE: ABNORMAL
SPECIMEN SOURCE: ABNORMAL
T VAGINALIS RRNA SPEC QL NAA+PROBE: POSITIVE
T VAGINALIS RRNA SPEC QL NAA+PROBE: POSITIVE

## 2024-05-29 ENCOUNTER — HOSPITAL ENCOUNTER (EMERGENCY)
Age: 19
Discharge: HOME | End: 2024-05-29
Payer: MEDICAID

## 2024-05-29 VITALS
WEIGHT: 180 LBS | TEMPERATURE: 98.5 F | DIASTOLIC BLOOD PRESSURE: 85 MMHG | HEIGHT: 65 IN | OXYGEN SATURATION: 100 % | SYSTOLIC BLOOD PRESSURE: 140 MMHG | BODY MASS INDEX: 29.99 KG/M2 | HEART RATE: 80 BPM | RESPIRATION RATE: 16 BRPM

## 2024-05-29 DIAGNOSIS — R21 RASH AND OTHER NONSPECIFIC SKIN ERUPTION: Primary | ICD-10-CM

## 2024-05-29 PROCEDURE — 99283 EMERGENCY DEPT VISIT LOW MDM: CPT

## 2024-05-29 ASSESSMENT — PAIN - FUNCTIONAL ASSESSMENT: PAIN_FUNCTIONAL_ASSESSMENT: 0-10

## 2024-05-29 ASSESSMENT — PAIN DESCRIPTION - LOCATION: LOCATION: HAND

## 2024-05-29 ASSESSMENT — PAIN SCALES - GENERAL: PAINLEVEL_OUTOF10: 8

## 2024-05-29 NOTE — ED PROVIDER NOTES
Emergency Department Provider Note       PCP: Kim Vance MD   Age: 19 y.o.   Sex: female     DISPOSITION     dc    ICD-10-CM    1. Rash and other nonspecific skin eruption  R[facility], located within [facility]          Medical Decision Making     As in HPI.  Patient reports skin abnormality to left palm that has been little present largely unchanged for the last 2 years but completely unchanged over the last 5 months.  Has been following with her PCP regarding this.  Has not been seen by dermatologist.  She has no new or worsening symptoms.  She has no vesicular lesion, no erythematous rash.  There is excoriation and findings concerning for tearing of the skin at the affected pulm.  Oral no lesions elsewhere noted.  States that she sometimes has similar lesions elsewhere in her body but she cannot locate any at this time.  I have low clinical suspicion sign for herpetic kim, latent/secondary syphilis, cellulitis, abscess, sepsis or other acute emergent process.  No indication of infection at this time.  Will have nursing staff apply dressing and I placed referral to dermatology.  Feel stable for discharge.  Patient is well-hydrated appearing, no distress.  Nontoxic-appearing, tolerating oral intake, hemodynamically stable. All findings and plan were discussed with the patient. All questions answered. Discussed with the patient that an unremarkable evaluation in the ED does not preclude the development or presence of a serious or life threatening condition. Patient was instructed to return immediately for any worsening or change in current symptoms, or if symptoms do not continue to improve. I instructed them to follow up with their primary care provider, own specialist, or medical provider that I am recommending for him within the next 2-3 days  The patient acknowledged understanding plan of care and affirmed approval.     Signed by: SHANNAN Richardson     This note created using Dragon voice

## 2024-05-29 NOTE — ED NOTES
Patient mobility status  with no difficulty. Provider aware     I have reviewed discharge instructions with the patient.  The patient verbalized understanding.    Patient left ED via Discharge Method: ambulatory to Home with  self .    Opportunity for questions and clarification provided.     Patient given 0 scripts.           Yaneth Llanos LPN  05/29/24 6647

## 2024-06-15 ENCOUNTER — HOSPITAL ENCOUNTER (EMERGENCY)
Age: 19
Discharge: HOME | End: 2024-06-15
Payer: MEDICAID

## 2024-06-15 VITALS
WEIGHT: 180 LBS | DIASTOLIC BLOOD PRESSURE: 64 MMHG | TEMPERATURE: 98.1 F | OXYGEN SATURATION: 99 % | SYSTOLIC BLOOD PRESSURE: 93 MMHG | HEIGHT: 65 IN | RESPIRATION RATE: 19 BRPM | BODY MASS INDEX: 29.99 KG/M2 | HEART RATE: 96 BPM

## 2024-06-15 DIAGNOSIS — N89.8 VAGINAL DISCHARGE: Primary | ICD-10-CM

## 2024-06-15 LAB
ALBUMIN SERPL-MCNC: 3.6 G/DL (ref 3.5–5)
ALBUMIN/GLOB SERPL: 1 (ref 1–1.9)
ALP SERPL-CCNC: 78 U/L (ref 35–104)
ALT SERPL-CCNC: 19 U/L (ref 12–65)
ANION GAP SERPL CALC-SCNC: 11 MMOL/L (ref 9–18)
APPEARANCE UR: CLEAR
AST SERPL-CCNC: 17 U/L (ref 15–37)
BACTERIA URNS QL MICRO: ABNORMAL /HPF
BASOPHILS # BLD: 0.1 K/UL (ref 0–0.2)
BASOPHILS NFR BLD: 1 % (ref 0–2)
BILIRUB SERPL-MCNC: 0.2 MG/DL (ref 0–1.2)
BILIRUB UR QL: NEGATIVE
BUN SERPL-MCNC: 11 MG/DL (ref 6–23)
CALCIUM SERPL-MCNC: 9.6 MG/DL (ref 8.8–10.2)
CHLORIDE SERPL-SCNC: 100 MMOL/L (ref 98–107)
CO2 SERPL-SCNC: 25 MMOL/L (ref 20–28)
COLOR UR: ABNORMAL
CREAT SERPL-MCNC: 0.75 MG/DL (ref 0.6–1.1)
DIFFERENTIAL METHOD BLD: ABNORMAL
EOSINOPHIL # BLD: 0.4 K/UL (ref 0–0.8)
EOSINOPHIL NFR BLD: 3 % (ref 0.5–7.8)
EPI CELLS #/AREA URNS HPF: ABNORMAL /HPF
ERYTHROCYTE [DISTWIDTH] IN BLOOD BY AUTOMATED COUNT: 13 % (ref 11.9–14.6)
GLOBULIN SER CALC-MCNC: 3.7 G/DL (ref 2.3–3.5)
GLUCOSE SERPL-MCNC: 97 MG/DL (ref 70–99)
GLUCOSE UR STRIP.AUTO-MCNC: NEGATIVE MG/DL
HCG UR QL: NEGATIVE
HCT VFR BLD AUTO: 43.7 % (ref 35.8–46.3)
HGB BLD-MCNC: 14.5 G/DL (ref 11.7–15.4)
HGB UR QL STRIP: ABNORMAL
IMM GRANULOCYTES # BLD AUTO: 0.1 K/UL (ref 0–0.5)
IMM GRANULOCYTES NFR BLD AUTO: 1 % (ref 0–5)
KETONES UR QL STRIP.AUTO: NEGATIVE MG/DL
LEUKOCYTE ESTERASE UR QL STRIP.AUTO: ABNORMAL
LIPASE SERPL-CCNC: 26 U/L (ref 13–60)
LYMPHOCYTES # BLD: 2.6 K/UL (ref 0.5–4.6)
LYMPHOCYTES NFR BLD: 23 % (ref 13–44)
MAGNESIUM SERPL-MCNC: 2 MG/DL (ref 1.8–2.4)
MCH RBC QN AUTO: 28.3 PG (ref 26.1–32.9)
MCHC RBC AUTO-ENTMCNC: 33.2 G/DL (ref 31.4–35)
MCV RBC AUTO: 85.2 FL (ref 82–102)
MONOCYTES # BLD: 1 K/UL (ref 0.1–1.3)
MONOCYTES NFR BLD: 9 % (ref 4–12)
NEUTS SEG # BLD: 7 K/UL (ref 1.7–8.2)
NEUTS SEG NFR BLD: 63 % (ref 43–78)
NITRITE UR QL STRIP.AUTO: POSITIVE
NRBC # BLD: 0 K/UL (ref 0–0.2)
OTHER OBSERVATIONS: ABNORMAL
PH UR STRIP: 6.5 (ref 5–9)
PLATELET # BLD AUTO: 307 K/UL (ref 150–450)
PMV BLD AUTO: 10.9 FL (ref 9.4–12.3)
POTASSIUM SERPL-SCNC: 4.6 MMOL/L (ref 3.5–5.1)
PROCALCITONIN SERPL-MCNC: <0.02 NG/ML (ref 0–0.1)
PROT SERPL-MCNC: 7.3 G/DL (ref 6.3–8.2)
PROT UR STRIP-MCNC: NEGATIVE MG/DL
RBC # BLD AUTO: 5.13 M/UL (ref 4.05–5.2)
RBC #/AREA URNS HPF: ABNORMAL /HPF
SERVICE CMNT-IMP: NORMAL
SODIUM SERPL-SCNC: 136 MMOL/L (ref 136–145)
SP GR UR REFRACTOMETRY: 1.02 (ref 1–1.02)
UROBILINOGEN UR QL STRIP.AUTO: 0.2 EU/DL (ref 0.2–1)
WBC # BLD AUTO: 11.2 K/UL (ref 4.3–11.1)
WBC URNS QL MICRO: ABNORMAL /HPF
WET PREP GENITAL: NORMAL
WET PREP GENITAL: NORMAL

## 2024-06-15 PROCEDURE — 85025 COMPLETE CBC W/AUTO DIFF WBC: CPT

## 2024-06-15 PROCEDURE — 84145 PROCALCITONIN (PCT): CPT

## 2024-06-15 PROCEDURE — 83690 ASSAY OF LIPASE: CPT

## 2024-06-15 PROCEDURE — 99284 EMERGENCY DEPT VISIT MOD MDM: CPT

## 2024-06-15 PROCEDURE — 87591 N.GONORRHOEAE DNA AMP PROB: CPT

## 2024-06-15 PROCEDURE — 87491 CHLMYD TRACH DNA AMP PROBE: CPT

## 2024-06-15 PROCEDURE — 80053 COMPREHEN METABOLIC PANEL: CPT

## 2024-06-15 PROCEDURE — 81025 URINE PREGNANCY TEST: CPT

## 2024-06-15 PROCEDURE — 96372 THER/PROPH/DIAG INJ SC/IM: CPT

## 2024-06-15 PROCEDURE — 81001 URINALYSIS AUTO W/SCOPE: CPT

## 2024-06-15 PROCEDURE — 87210 SMEAR WET MOUNT SALINE/INK: CPT

## 2024-06-15 PROCEDURE — 87186 SC STD MICRODIL/AGAR DIL: CPT

## 2024-06-15 PROCEDURE — 87088 URINE BACTERIA CULTURE: CPT

## 2024-06-15 PROCEDURE — 6360000002 HC RX W HCPCS: Performed by: NURSE PRACTITIONER

## 2024-06-15 PROCEDURE — 83735 ASSAY OF MAGNESIUM: CPT

## 2024-06-15 PROCEDURE — 2580000003 HC RX 258: Performed by: NURSE PRACTITIONER

## 2024-06-15 PROCEDURE — 87086 URINE CULTURE/COLONY COUNT: CPT

## 2024-06-15 RX ORDER — DOXYCYCLINE HYCLATE 100 MG
100 TABLET ORAL 2 TIMES DAILY
Qty: 14 TABLET | Refills: 0 | Status: SHIPPED | OUTPATIENT
Start: 2024-06-15 | End: 2024-06-22

## 2024-06-15 RX ADMIN — WATER 500 MG: 1 INJECTION INTRAMUSCULAR; INTRAVENOUS; SUBCUTANEOUS at 22:32

## 2024-06-15 ASSESSMENT — PAIN - FUNCTIONAL ASSESSMENT: PAIN_FUNCTIONAL_ASSESSMENT: NONE - DENIES PAIN

## 2024-06-15 ASSESSMENT — LIFESTYLE VARIABLES
HOW OFTEN DO YOU HAVE A DRINK CONTAINING ALCOHOL: 4 OR MORE TIMES A WEEK
HOW MANY STANDARD DRINKS CONTAINING ALCOHOL DO YOU HAVE ON A TYPICAL DAY: 3 OR 4

## 2024-06-16 LAB
BACTERIA SPEC CULT: NORMAL
SERVICE CMNT-IMP: NORMAL

## 2024-06-16 NOTE — DISCHARGE INSTRUCTIONS
Refrain from sexual activity until test results return, symptoms have resolved and treatment has been completed.  If any positive result, have sexual partners tested and treated as appropriate.  Take medications as prescribed.  Follow-up with recommended provider in the next 1-2 days.  Return to the ED immediately for any new, worsening, concerning symptoms; or for danger signs as discussed.

## 2024-06-16 NOTE — ED PROVIDER NOTES
Calcium 9.6 8.8 - 10.2 MG/DL    Total Bilirubin 0.2 0.0 - 1.2 MG/DL    ALT 19 12 - 65 U/L    AST 17 15 - 37 U/L    Alk Phosphatase 78 35 - 104 U/L    Total Protein 7.3 6.3 - 8.2 g/dL    Albumin 3.6 3.5 - 5.0 g/dL    Globulin 3.7 (H) 2.3 - 3.5 g/dL    Albumin/Globulin Ratio 1.0 1.0 - 1.9     Magnesium   Result Value Ref Range    Magnesium 2.0 1.8 - 2.4 mg/dL   Procalcitonin   Result Value Ref Range    Procalcitonin <0.02 0.00 - 0.10 ng/mL   Lipase   Result Value Ref Range    Lipase 26 13 - 60 U/L   Urinalysis   Result Value Ref Range    Color, UA YELLOW/STRAW      Appearance CLEAR      Specific Gravity, UA 1.021 1.001 - 1.023      pH, Urine 6.5 5.0 - 9.0      Protein, UA Negative NEG mg/dL    Glucose, Ur Negative mg/dL    Ketones, Urine Negative NEG mg/dL    Bilirubin, Urine Negative NEG      Blood, Urine MODERATE (A) NEG      Urobilinogen, Urine 0.2 0.2 - 1.0 EU/dL    Nitrite, Urine Positive (A) NEG      Leukocyte Esterase, Urine TRACE (A) NEG      WBC, UA 3-5 0 /hpf    RBC, UA 0-3 0 /hpf    Epithelial Cells, UA 10-20 0 /hpf    BACTERIA, URINE 4+ (H) 0 /hpf    Other observations RESULTS VERIFIED MANUALLY     Urine Preg (Lab)   Result Value Ref Range    Pregnancy, Urine Negative NEG           No orders to display                No results for input(s): \"COVID19\" in the last 72 hours.    Voice dictation software was used during the making of this note.  This software is not perfect and grammatical and other typographical errors may be present.  This note has not been completely proofread for errors.     Richard Cuevas, APRN - CNP  06/15/24 6650

## 2024-06-18 NOTE — ED NOTES
I have reviewed discharge instructions with the patient. The patient verbalized understanding. Patient left ED via Discharge Method: ambulatory to Home with boyfriend    Opportunity for questions and clarification provided. Patient given 1 scripts. medication explained to pt and medication and discharge were explained to mother over the phone both mother and pt v\u       To continue your aftercare when you leave the hospital, you may receive an automated call from our care team to check in on how you are doing. This is a free service and part of our promise to provide the best care and service to meet your aftercare needs.  If you have questions, or wish to unsubscribe from this service please call [phone]. Thank you for Choosing our [facility] Emergency Department.
pts mother called and permission was given over the phone to care of her daughter and discharge was discussed by dr hammer with mother over the phone
complains of pain/discomfort

## 2024-06-19 LAB
BACTERIA SPEC CULT: ABNORMAL
BACTERIA SPEC CULT: ABNORMAL
SERVICE CMNT-IMP: ABNORMAL

## 2024-10-21 ENCOUNTER — HOSPITAL ENCOUNTER (EMERGENCY)
Age: 19
Discharge: HOME | End: 2024-10-21

## 2024-10-21 VITALS
DIASTOLIC BLOOD PRESSURE: 87 MMHG | OXYGEN SATURATION: 98 % | RESPIRATION RATE: 16 BRPM | HEART RATE: 100 BPM | HEIGHT: 65 IN | WEIGHT: 150 LBS | BODY MASS INDEX: 24.99 KG/M2 | TEMPERATURE: 98.2 F | SYSTOLIC BLOOD PRESSURE: 119 MMHG

## 2024-10-21 DIAGNOSIS — K02.9 DENTAL CARIES: ICD-10-CM

## 2024-10-21 DIAGNOSIS — K08.89 PAIN, DENTAL: Primary | ICD-10-CM

## 2024-10-21 PROCEDURE — 6370000000 HC RX 637 (ALT 250 FOR IP): Performed by: STUDENT IN AN ORGANIZED HEALTH CARE EDUCATION/TRAINING PROGRAM

## 2024-10-21 PROCEDURE — 99283 EMERGENCY DEPT VISIT LOW MDM: CPT

## 2024-10-21 RX ORDER — ONDANSETRON 4 MG/1
4 TABLET, ORALLY DISINTEGRATING ORAL
Status: COMPLETED | OUTPATIENT
Start: 2024-10-21 | End: 2024-10-21

## 2024-10-21 RX ORDER — OXYCODONE HYDROCHLORIDE 5 MG/1
5 TABLET ORAL
Status: COMPLETED | OUTPATIENT
Start: 2024-10-21 | End: 2024-10-21

## 2024-10-21 RX ORDER — OXYCODONE HYDROCHLORIDE 5 MG/1
5 TABLET ORAL EVERY 6 HOURS PRN
Qty: 12 TABLET | Refills: 0 | Status: SHIPPED | OUTPATIENT
Start: 2024-10-21 | End: 2024-10-24

## 2024-10-21 RX ORDER — ONDANSETRON 4 MG/1
4 TABLET, FILM COATED ORAL 3 TIMES DAILY PRN
Qty: 15 TABLET | Refills: 0 | Status: SHIPPED | OUTPATIENT
Start: 2024-10-21

## 2024-10-21 RX ADMIN — OXYCODONE 5 MG: 5 TABLET ORAL at 19:11

## 2024-10-21 RX ADMIN — AMOXICILLIN AND CLAVULANATE POTASSIUM 1 TABLET: 875; 125 TABLET, FILM COATED ORAL at 19:11

## 2024-10-21 RX ADMIN — ONDANSETRON 4 MG: 4 TABLET, ORALLY DISINTEGRATING ORAL at 19:11

## 2024-10-21 ASSESSMENT — ENCOUNTER SYMPTOMS
PHOTOPHOBIA: 0
FACIAL SWELLING: 0
TROUBLE SWALLOWING: 0
VOMITING: 0
ABDOMINAL PAIN: 0
COUGH: 0
SHORTNESS OF BREATH: 0

## 2024-10-21 ASSESSMENT — PAIN SCALES - GENERAL
PAINLEVEL_OUTOF10: 6
PAINLEVEL_OUTOF10: 6

## 2024-10-21 ASSESSMENT — PAIN - FUNCTIONAL ASSESSMENT: PAIN_FUNCTIONAL_ASSESSMENT: 0-10

## 2024-10-21 ASSESSMENT — PAIN DESCRIPTION - LOCATION: LOCATION: MOUTH

## 2024-10-21 NOTE — DISCHARGE INSTRUCTIONS
Dental Services    [facility]  Please call [phone] as soon as possible to set up close follow-up.       Address: [address] [place] [address]  Hours Monday - Friday 10am - 6:30pm Weekends on call for emergencies     Listed below are free or low-cost options.    [facility] [address] [place], [place] [address] [phone]  Tuesday and Thursday- registration starts at 10am. After registering you are given a time to return  Provides free x-rays, extractions, treatment of infection, and dental hygeine.   Cannot have medicare, medicaid or other medical insurance coverage  Bring proof of [facility]** residency (power bill, for example), Social Security Number and household income documents (including food stamps) as well as any current medications.    (**if you do not live in [facility], contact the free clinic in your home county for the availability of dental services)    [place] [id number] [phone]  Monday and Wednesday 8a-5p  Tuesday and Thursday    8a-7p  Friday                               12-5p  Provides Adult and Childrens services. X-rays, extractions, treatment of infection, restorative care  Accepts medicaid, private insurance, self-pay. Fees are on a sliding scale based on income (need to bring documentation)    [facility] [address] [place] [address]     [phone]  Monday - Friday 8am-5p  Accepts medicaid for dental (but not dentures under 21)  Provides xrays, extractions, partials and dentures    [facility] [address] [place] [address], [address], [place] [id number] [phone]  Monday- Friday 9a-5p  First Come- First Served  Accepts medicaid, or self pay at or near medicaid price.  Urgent Care only for xrays, extractions fillings and infections    [facility] -- Call [facility] [phone],   --- request a visit from the

## 2024-10-21 NOTE — ED PROVIDER NOTES
Emergency Department Provider Note       PCP: Kim Vance MD   Age: 19 y.o.   Sex: female     DISPOSITION Decision To Discharge 10/21/2024 07:16:41 PM  Condition at Disposition: Data Unavailable       ICD-10-CM    1. Pain, dental  K08.89 oxyCODONE (ROXICODONE) 5 MG immediate release tablet      2. Dental caries  K02.9 oxyCODONE (ROXICODONE) 5 MG immediate release tablet          Medical Decision Making     In summary this is a 19-year-old female patient presented for evaluation of ongoing dental pain secondary to dental caries. Based on my evaluation I feel the patient is at low risk for more serious cause of diagnosis such as dental fracture, necrotizing ulcerative gingivitis, dental abscess, Jose Juan's angina, peritonsillar abscess, respiratory compromise.  Reasoning behind my decision making process is that the patient is grossly well-appearing on exam and in no acute distress.  Vitals are stable without fever.  There is no history of trauma to suggest fracture.  No obvious broken teeth.  No obvious dental swelling to suggest abscess.  No tooth elevation.  No submandibular swelling or brawny texture to suggest Ludwigs.  Uvula is midline without peritonsillar swelling or signs of abscess.  No respiratory obstruction or increased work of breath or to suggest respiratory compromise.  No immunosuppression.  No gingival bleeding, systemic symptoms, regional lymphadenopathy.  Plan for this patient is outpatient management. Pain control given.  Antibiotics started.  Referral for dentist given.  Counseled patient on warning signs return immediately for including but limited to fevers, respiratory compromise, severe pain.  Patient verbalized understanding and is in agreement with treatment plan.       1 chronic illness with exacerbation.  Prescription drug management performed.  Patient was discharged risks and benefits of hospitalization were considered.  Chronic medical problems impacting care include poor

## 2024-10-21 NOTE — ED NOTES
Patient mobility status  with no difficulty. Provider aware     I have reviewed discharge instructions with the patient.  The patient verbalized understanding.    Patient left ED via Discharge Method: ambulatory to Home with  mother .    Opportunity for questions and clarification provided.     Patient given 3 scripts.

## 2025-02-25 ENCOUNTER — OFFICE VISIT (OUTPATIENT)
Dept: FAMILY MEDICINE CLINIC | Facility: CLINIC | Age: 20
End: 2025-02-25

## 2025-02-25 VITALS
SYSTOLIC BLOOD PRESSURE: 90 MMHG | TEMPERATURE: 98.7 F | WEIGHT: 166.2 LBS | DIASTOLIC BLOOD PRESSURE: 60 MMHG | HEART RATE: 99 BPM | BODY MASS INDEX: 28.38 KG/M2 | HEIGHT: 64 IN | OXYGEN SATURATION: 97 %

## 2025-02-25 DIAGNOSIS — Z00.00 WELL ADULT EXAM: Primary | ICD-10-CM

## 2025-02-25 DIAGNOSIS — L20.82 FLEXURAL ECZEMA: ICD-10-CM

## 2025-02-25 DIAGNOSIS — F31.81 BIPOLAR 2 DISORDER (HCC): ICD-10-CM

## 2025-02-25 DIAGNOSIS — L20.84 INTRINSIC ATOPIC DERMATITIS: ICD-10-CM

## 2025-02-25 DIAGNOSIS — Z30.9 ENCOUNTER FOR CONTRACEPTIVE MANAGEMENT, UNSPECIFIED TYPE: ICD-10-CM

## 2025-02-25 RX ORDER — ESCITALOPRAM OXALATE 10 MG/1
10 TABLET ORAL DAILY
COMMUNITY
Start: 2025-01-20 | End: 2025-02-25 | Stop reason: SDUPTHER

## 2025-02-25 RX ORDER — HYDROXYZINE HYDROCHLORIDE 25 MG/1
25 TABLET, FILM COATED ORAL EVERY 6 HOURS PRN
COMMUNITY
Start: 2025-01-20 | End: 2025-02-25 | Stop reason: SDUPTHER

## 2025-02-25 RX ORDER — HYDROXYZINE HYDROCHLORIDE 25 MG/1
25 TABLET, FILM COATED ORAL 2 TIMES DAILY PRN
Qty: 180 TABLET | Refills: 3 | Status: SHIPPED | OUTPATIENT
Start: 2025-02-25

## 2025-02-25 RX ORDER — ESCITALOPRAM OXALATE 10 MG/1
10 TABLET ORAL DAILY
Qty: 90 TABLET | Refills: 3 | Status: SHIPPED | OUTPATIENT
Start: 2025-02-25

## 2025-02-25 RX ORDER — ETONOGESTREL 68 MG/1
68 IMPLANT SUBCUTANEOUS ONCE
COMMUNITY

## 2025-02-25 RX ORDER — MELATONIN 1 MG
TABLET,CHEWABLE ORAL NIGHTLY PRN
COMMUNITY

## 2025-02-25 RX ORDER — CLOTRIMAZOLE AND BETAMETHASONE DIPROPIONATE 10; .64 MG/G; MG/G
CREAM TOPICAL
Qty: 45 G | Refills: 5 | Status: SHIPPED | OUTPATIENT
Start: 2025-02-25

## 2025-02-25 SDOH — ECONOMIC STABILITY: FOOD INSECURITY: WITHIN THE PAST 12 MONTHS, THE FOOD YOU BOUGHT JUST DIDN'T LAST AND YOU DIDN'T HAVE MONEY TO GET MORE.: OFTEN TRUE

## 2025-02-25 SDOH — ECONOMIC STABILITY: FOOD INSECURITY: WITHIN THE PAST 12 MONTHS, YOU WORRIED THAT YOUR FOOD WOULD RUN OUT BEFORE YOU GOT MONEY TO BUY MORE.: OFTEN TRUE

## 2025-02-25 ASSESSMENT — PATIENT HEALTH QUESTIONNAIRE - PHQ9
1. LITTLE INTEREST OR PLEASURE IN DOING THINGS: MORE THAN HALF THE DAYS
SUM OF ALL RESPONSES TO PHQ QUESTIONS 1-9: 13
4. FEELING TIRED OR HAVING LITTLE ENERGY: MORE THAN HALF THE DAYS
7. TROUBLE CONCENTRATING ON THINGS, SUCH AS READING THE NEWSPAPER OR WATCHING TELEVISION: MORE THAN HALF THE DAYS
3. TROUBLE FALLING OR STAYING ASLEEP: NEARLY EVERY DAY
SUM OF ALL RESPONSES TO PHQ QUESTIONS 1-9: 13
6. FEELING BAD ABOUT YOURSELF - OR THAT YOU ARE A FAILURE OR HAVE LET YOURSELF OR YOUR FAMILY DOWN: SEVERAL DAYS
8. MOVING OR SPEAKING SO SLOWLY THAT OTHER PEOPLE COULD HAVE NOTICED. OR THE OPPOSITE, BEING SO FIGETY OR RESTLESS THAT YOU HAVE BEEN MOVING AROUND A LOT MORE THAN USUAL: SEVERAL DAYS
9. THOUGHTS THAT YOU WOULD BE BETTER OFF DEAD, OR OF HURTING YOURSELF: NOT AT ALL
SUM OF ALL RESPONSES TO PHQ QUESTIONS 1-9: 13
2. FEELING DOWN, DEPRESSED OR HOPELESS: NOT AT ALL
SUM OF ALL RESPONSES TO PHQ9 QUESTIONS 1 & 2: 2
5. POOR APPETITE OR OVEREATING: MORE THAN HALF THE DAYS
10. IF YOU CHECKED OFF ANY PROBLEMS, HOW DIFFICULT HAVE THESE PROBLEMS MADE IT FOR YOU TO DO YOUR WORK, TAKE CARE OF THINGS AT HOME, OR GET ALONG WITH OTHER PEOPLE: EXTREMELY DIFFICULT
SUM OF ALL RESPONSES TO PHQ QUESTIONS 1-9: 13

## 2025-02-25 NOTE — PATIENT INSTRUCTIONS
Call for hours and availability.     [facility]   They offer: Food   Contact:  [phone] [address] [place] [address]  Helpful Info: Hours are Tuesday and Thursday 10am- 12:30pm

## 2025-02-25 NOTE — PROGRESS NOTES
Past Surgical History:   Procedure Laterality Date     SECTION  2021    HEENT      Foreign body removal from right eye       Social History     Socioeconomic History    Marital status: Single     Spouse name: Not on file    Number of children: Not on file    Years of education: Not on file    Highest education level: Not on file   Occupational History    Not on file   Tobacco Use    Smoking status: Every Day     Current packs/day: 1.00     Types: Cigarettes    Smokeless tobacco: Never   Vaping Use    Vaping status: Every Day    Substances: Nicotine, CBD   Substance and Sexual Activity    Alcohol use: Yes     Comment: ocassionally    Drug use: Yes     Types: Marijuana (Weed), Other-see comments     Comment: extasy    Sexual activity: Not on file   Other Topics Concern    Not on file   Social History Narrative    Not on file     Social Determinants of Health     Financial Resource Strain: Low Risk  (2024)    Overall Financial Resource Strain (CARDIA)     Difficulty of Paying Living Expenses: Not hard at all   Food Insecurity: Food Insecurity Present (2025)    Hunger Vital Sign     Worried About Running Out of Food in the Last Year: Often true     Ran Out of Food in the Last Year: Often true   Transportation Needs: No Transportation Needs (2025)    PRAPARE - Transportation     Lack of Transportation (Medical): No     Lack of Transportation (Non-Medical): No   Physical Activity: Not on file   Stress: Not on file   Social Connections: Unknown (3/19/2021)    Received from [facility]    Social Connections     Frequency of Communication with Friends and Family: Not asked     Frequency of Social Gatherings with Friends and Family: Not asked   Intimate Partner Violence: Unknown (3/19/2021)    Received from [facility]    Intimate Partner Violence     Fear of Current or Ex-Partner: Not asked     Emotionally Abused: Not asked     Physically Abused: Not asked

## 2025-03-10 ENCOUNTER — OFFICE VISIT (OUTPATIENT)
Dept: OBGYN CLINIC | Age: 20
End: 2025-03-10
Payer: MEDICAID

## 2025-03-10 VITALS — HEIGHT: 64 IN | BODY MASS INDEX: 29.59 KG/M2 | WEIGHT: 173.3 LBS

## 2025-03-10 DIAGNOSIS — Z30.431 ENCOUNTER FOR MANAGEMENT OF INTRAUTERINE CONTRACEPTIVE DEVICE (IUD), UNSPECIFIED IUD MANAGEMENT TYPE: Primary | ICD-10-CM

## 2025-03-10 PROCEDURE — 99212 OFFICE O/P EST SF 10 MIN: CPT | Performed by: OBSTETRICS & GYNECOLOGY

## 2025-03-10 NOTE — PROGRESS NOTES
HPI:  Ms. Blanco is a 19 y.o.   OB History          2    Para        Term   2            AB        Living   2         SAB        IAB        Ectopic        Molar        Multiple        Live Births                 who is here today as a new patient referral from Dr. Kim Vance to discuss birth replacement of Nexplanon or new options for birth control.Having a lot of AUB, it has .   Current Nexplanon was inserted by Dr. Melo on 22.     Date Performed Result   PAP Never    Mammogram N/A    Colonoscopy N/A    Dexa N/A      GYN History         No LMP recorded. Patient has had an implant.     Past Medical History:  Past Medical History:   Diagnosis Date    Allergic rhinitis     Anemia, antepartum 2020    Anxiety     Asthma, intermittent     Bipolar 2 disorder (HCC)         Deliberate self-cutting     Depression     Encounter for immunization 2020    History of shoulder dystocia in prior pregnancy     HSV-1 infection     Hypertension     Inhalation injury 2018    Insomnia     Lactose intolerance     Oppositional defiant disorder     [facility] - Dr. Carpenter    Orthostatic hypotension     Pre-eclampsia 2020    with first pregnancy    Rh negative state in antepartum period     Shoulder dystocia during labor and delivery        Past Surgical History:  Past Surgical History:   Procedure Laterality Date     SECTION  2021    HEENT      Foreign body removal from right eye       Allergies:   Allergies   Allergen Reactions    Other Hives     Patient states allergy to tress, grass, and dust mites. Patient's mother also stated allergy to dogs.     Seasonal        Medication History:  Current Outpatient Medications   Medication Sig Dispense Refill    Melatonin Gummies 5 MG CHEW by NOT APPLICABLE route nightly as needed Takes 4 PRN for sleep.  5 mg each gummy to equal 20 mg.      etonogestrel (NEXPLANON) 68 MG implant 68 mg by Subdermal route once

## 2025-03-11 ENCOUNTER — PATIENT MESSAGE (OUTPATIENT)
Dept: OBGYN CLINIC | Age: 20
End: 2025-03-11

## 2025-03-17 ENCOUNTER — OFFICE VISIT (OUTPATIENT)
Dept: BEHAVIORAL/MENTAL HEALTH CLINIC | Age: 20
End: 2025-03-17
Payer: MEDICAID

## 2025-03-17 VITALS — DIASTOLIC BLOOD PRESSURE: 64 MMHG | OXYGEN SATURATION: 96 % | SYSTOLIC BLOOD PRESSURE: 116 MMHG | HEART RATE: 78 BPM

## 2025-03-17 DIAGNOSIS — F31.81 BIPOLAR II DISORDER (HCC): ICD-10-CM

## 2025-03-17 DIAGNOSIS — F43.10 COMPLEX POSTTRAUMATIC STRESS DISORDER: Primary | ICD-10-CM

## 2025-03-17 PROCEDURE — 99214 OFFICE O/P EST MOD 30 MIN: CPT | Performed by: STUDENT IN AN ORGANIZED HEALTH CARE EDUCATION/TRAINING PROGRAM

## 2025-03-17 RX ORDER — LAMOTRIGINE 25 MG/1
TABLET ORAL
Qty: 42 TABLET | Refills: 0 | Status: SHIPPED | OUTPATIENT
Start: 2025-03-17

## 2025-03-17 RX ORDER — LAMOTRIGINE 100 MG/1
100 TABLET ORAL DAILY
Qty: 30 TABLET | Refills: 2 | Status: SHIPPED | OUTPATIENT
Start: 2025-03-17

## 2025-03-17 ASSESSMENT — PATIENT HEALTH QUESTIONNAIRE - PHQ9
10. IF YOU CHECKED OFF ANY PROBLEMS, HOW DIFFICULT HAVE THESE PROBLEMS MADE IT FOR YOU TO DO YOUR WORK, TAKE CARE OF THINGS AT HOME, OR GET ALONG WITH OTHER PEOPLE: VERY DIFFICULT
2. FEELING DOWN, DEPRESSED OR HOPELESS: NOT AT ALL
5. POOR APPETITE OR OVEREATING: MORE THAN HALF THE DAYS
SUM OF ALL RESPONSES TO PHQ QUESTIONS 1-9: 13
9. THOUGHTS THAT YOU WOULD BE BETTER OFF DEAD, OR OF HURTING YOURSELF: NOT AT ALL
4. FEELING TIRED OR HAVING LITTLE ENERGY: MORE THAN HALF THE DAYS
3. TROUBLE FALLING OR STAYING ASLEEP: NEARLY EVERY DAY
6. FEELING BAD ABOUT YOURSELF - OR THAT YOU ARE A FAILURE OR HAVE LET YOURSELF OR YOUR FAMILY DOWN: SEVERAL DAYS
8. MOVING OR SPEAKING SO SLOWLY THAT OTHER PEOPLE COULD HAVE NOTICED. OR THE OPPOSITE, BEING SO FIGETY OR RESTLESS THAT YOU HAVE BEEN MOVING AROUND A LOT MORE THAN USUAL: SEVERAL DAYS
SUM OF ALL RESPONSES TO PHQ QUESTIONS 1-9: 13
1. LITTLE INTEREST OR PLEASURE IN DOING THINGS: MORE THAN HALF THE DAYS
7. TROUBLE CONCENTRATING ON THINGS, SUCH AS READING THE NEWSPAPER OR WATCHING TELEVISION: MORE THAN HALF THE DAYS
SUM OF ALL RESPONSES TO PHQ QUESTIONS 1-9: 13
SUM OF ALL RESPONSES TO PHQ QUESTIONS 1-9: 13

## 2025-03-17 NOTE — PROGRESS NOTES
hospitalized for mood fluctuations, including periods of elevated energy, motivation, increased goal-directed activities, and decreased need for sleep that lasted for a few days, followed by a significant crash and depression. States that she was diagnosed with Bipolar II while in hospital, but denies significant medication changes (restarted on Lexapro and Hydroxyzine). Denies SI/HI, A/VH, paranoia or delusions. Discussed trial of Lamictal for mood stabilization and will f/u in 1 mo.       Diagnosis:   Complex PTSD  Bipolar II, most recent episode depressed  Panic disorder         Plan:    Calista Blanco will receive medication management at [facility].    Medication Recommendations:    - Continue Lexapro 10 mg daily for depression, anxiety    - Start Lamictal 25 mg daily for two weeks, then increase to 50 mg daily for two weeks, then increase to 100 mg daily as tolerated for mood stabilization    - Medication side effect profiles, risks, and benefits were discussed with the patient.    - Patient encouraged to contact the clinic if experiencing any adverse reactions with medications.    - I have checked the [place] PDMP website prior to prescribing a controlled substance.       Other Recommendations:    - recently established with local therapist    - If patient has any concerns for their own safety due to adverse reactions to medications, suicidal or homicidal ideations, auditory or visual hallucinations, or delusions, they have been told to call 911 or go to the nearest emergency department.      RTC: 1 mo        Igor Contreras MD    3/17/2025 10:27 AM    [facility]

## 2025-04-11 ENCOUNTER — OFFICE VISIT (OUTPATIENT)
Dept: BEHAVIORAL/MENTAL HEALTH CLINIC | Age: 20
End: 2025-04-11
Payer: MEDICAID

## 2025-04-11 DIAGNOSIS — F31.81 BIPOLAR II DISORDER (HCC): ICD-10-CM

## 2025-04-11 DIAGNOSIS — F43.10 COMPLEX POSTTRAUMATIC STRESS DISORDER: Primary | ICD-10-CM

## 2025-04-11 PROCEDURE — 99214 OFFICE O/P EST MOD 30 MIN: CPT | Performed by: STUDENT IN AN ORGANIZED HEALTH CARE EDUCATION/TRAINING PROGRAM

## 2025-04-11 NOTE — PROGRESS NOTES
[facility]      Patient Name: Calista Blanco    Patient : 2005    Patient MRN: [id number]    Primary Language: English      Date of Service: 2025    Type of Service: Medication management    Other Services Involved: N/A       Phone Number: [phone]   Emergency Contact: mother Cai, [phone]       Chief Complaint: \"I can't sleep\"      History of Present Illness    Calista Blanco is a 20 y.o. female with reported prior psychiatric history significant for depression, anxiety who presents for medication management. They are currently prescribed: Lexapro 10 mg daily, Lamictal 100 mg daily. Presents with mother for collateral.    Pt reports that she has not been sleeping well, expressing that her sleep schedule has shifted to around 0500 and waking up in the afternoon. Notes that insomnia has been a chronic issue. She has missed a day of Lamictal 25 and lamictal 50 mg on separate occasions, but otherwise reports tolerability (has two days left of 50 mg dose). Cannot appreciate a significant difference as of yet, but thinks that she may be less irritable sometimes. Denies SI/HI, A/VH, paranoia or delusions.      Last Visit (3/17/25):  Pt reports that she has been working with [employer] for the past few months, stating that she was told that she has bipolar II. She reportedly was hospitalized around Nov/Dec '24, stating that she would not sleep as well, be more engaged in activities (such as deep cleaning), \"I felt pretty good.\" Reports that she was prescribed Lexapro and Hydroxyzine in the hospital, but not mood stabilizers. Notes that she has been feeling more depressed and often experiences a crash after periods of elevated mood, energy that will last for several days. Denies SI/HI, A/VH, paranoia or delusions.      Psychiatric History    Please see prior records.    No updates at this time.         Family History    Please see prior

## 2025-05-13 ENCOUNTER — OFFICE VISIT (OUTPATIENT)
Dept: BEHAVIORAL/MENTAL HEALTH CLINIC | Age: 20
End: 2025-05-13

## 2025-05-13 VITALS — DIASTOLIC BLOOD PRESSURE: 76 MMHG | SYSTOLIC BLOOD PRESSURE: 120 MMHG | OXYGEN SATURATION: 98 % | HEART RATE: 78 BPM

## 2025-05-13 DIAGNOSIS — F43.10 COMPLEX POSTTRAUMATIC STRESS DISORDER: Primary | ICD-10-CM

## 2025-05-13 DIAGNOSIS — F31.81 BIPOLAR II DISORDER (HCC): ICD-10-CM

## 2025-05-13 RX ORDER — CLONIDINE HYDROCHLORIDE 0.1 MG/1
0.1 TABLET ORAL
Qty: 30 TABLET | Refills: 2 | Status: SHIPPED | OUTPATIENT
Start: 2025-05-13

## 2025-05-13 ASSESSMENT — PATIENT HEALTH QUESTIONNAIRE - PHQ9
SUM OF ALL RESPONSES TO PHQ QUESTIONS 1-9: 15
2. FEELING DOWN, DEPRESSED OR HOPELESS: SEVERAL DAYS
7. TROUBLE CONCENTRATING ON THINGS, SUCH AS READING THE NEWSPAPER OR WATCHING TELEVISION: SEVERAL DAYS
1. LITTLE INTEREST OR PLEASURE IN DOING THINGS: NEARLY EVERY DAY
SUM OF ALL RESPONSES TO PHQ QUESTIONS 1-9: 15
4. FEELING TIRED OR HAVING LITTLE ENERGY: NEARLY EVERY DAY
3. TROUBLE FALLING OR STAYING ASLEEP: NEARLY EVERY DAY
1. LITTLE INTEREST OR PLEASURE IN DOING THINGS: NEARLY EVERY DAY
3. TROUBLE FALLING OR STAYING ASLEEP: NEARLY EVERY DAY
5. POOR APPETITE OR OVEREATING: NEARLY EVERY DAY
SUM OF ALL RESPONSES TO PHQ QUESTIONS 1-9: 15
8. MOVING OR SPEAKING SO SLOWLY THAT OTHER PEOPLE COULD HAVE NOTICED. OR THE OPPOSITE, BEING SO FIGETY OR RESTLESS THAT YOU HAVE BEEN MOVING AROUND A LOT MORE THAN USUAL: NOT AT ALL
2. FEELING DOWN, DEPRESSED OR HOPELESS: SEVERAL DAYS
6. FEELING BAD ABOUT YOURSELF - OR THAT YOU ARE A FAILURE OR HAVE LET YOURSELF OR YOUR FAMILY DOWN: SEVERAL DAYS
10. IF YOU CHECKED OFF ANY PROBLEMS, HOW DIFFICULT HAVE THESE PROBLEMS MADE IT FOR YOU TO DO YOUR WORK, TAKE CARE OF THINGS AT HOME, OR GET ALONG WITH OTHER PEOPLE: EXTREMELY DIFFICULT
7. TROUBLE CONCENTRATING ON THINGS, SUCH AS READING THE NEWSPAPER OR WATCHING TELEVISION: SEVERAL DAYS
6. FEELING BAD ABOUT YOURSELF - OR THAT YOU ARE A FAILURE OR HAVE LET YOURSELF OR YOUR FAMILY DOWN: SEVERAL DAYS
9. THOUGHTS THAT YOU WOULD BE BETTER OFF DEAD, OR OF HURTING YOURSELF: NOT AT ALL
8. MOVING OR SPEAKING SO SLOWLY THAT OTHER PEOPLE COULD HAVE NOTICED. OR THE OPPOSITE - BEING SO FIDGETY OR RESTLESS THAT YOU HAVE BEEN MOVING AROUND A LOT MORE THAN USUAL: NOT AT ALL
5. POOR APPETITE OR OVEREATING: NEARLY EVERY DAY
4. FEELING TIRED OR HAVING LITTLE ENERGY: NEARLY EVERY DAY
9. THOUGHTS THAT YOU WOULD BE BETTER OFF DEAD, OR OF HURTING YOURSELF: NOT AT ALL
10. IF YOU CHECKED OFF ANY PROBLEMS, HOW DIFFICULT HAVE THESE PROBLEMS MADE IT FOR YOU TO DO YOUR WORK, TAKE CARE OF THINGS AT HOME, OR GET ALONG WITH OTHER PEOPLE: EXTREMELY DIFFICULT

## 2025-05-13 NOTE — PROGRESS NOTES
[facility]      Patient Name: Calista Blanco    Patient : 2005    Patient MRN: [id number]    Primary Language: English      Date of Service: 2025    Type of Service: Medication management    Other Services Involved: N/A       Phone Number: [phone]   Emergency Contact: mother Cai, [phone]       Chief Complaint: \"I'm ok\"      History of Present Illness    Calista Blanco is a 20 y.o. female with reported prior psychiatric history significant for depression, anxiety who presents for medication management. They are currently prescribed: Lexapro 10 mg daily, Lamictal 100 mg daily. Presents with mother for collateral.    Pt reports that she continues to experience significant difficulty with insomnia, noting that she often can't fall asleep until 0600. States that her sleep is such a concern that she cannot appreciate significant response to medication trials. She is scheduled to meet with new PCP this week and discussed potential benefit for sleep study. Notes that she recently experienced sun burn on her face, which resulted in blistering of her lips. She has also experienced mouth sores (has known herpes, but reports that the sores are in a different distribution than typical flares). Denies active SI/HI, A/VH, paranoia or delusions.      Last Visit (25):  Pt reports that she has not been sleeping well, expressing that her sleep schedule has shifted to around 0500 and waking up in the afternoon. Notes that insomnia has been a chronic issue. She has missed a day of Lamictal 25 and lamictal 50 mg on separate occasions, but otherwise reports tolerability (has two days left of 50 mg dose). Cannot appreciate a significant difference as of yet, but thinks that she may be less irritable sometimes. Denies SI/HI, A/VH, paranoia or delusions.      Psychiatric History    Please see prior records.    No updates at this time.         Family

## 2025-05-15 DIAGNOSIS — Z01.812 PRE-PROCEDURE LAB EXAM: Primary | ICD-10-CM

## 2025-05-15 RX ORDER — MISOPROSTOL 200 UG/1
TABLET ORAL
Qty: 3 TABLET | Refills: 0 | Status: SHIPPED | OUTPATIENT
Start: 2025-05-15

## 2025-05-15 NOTE — TELEPHONE ENCOUNTER
Spoke with Dr. Piedra.  Cytotec 200mcg take 3 tablets by oral route night prior to IUD insertion  Valium 5mg x 1 dose- take po 1 hour prior to IUD insertion.    Cytotec sent to pharmacy on file.  Tracy routed to Dr. Piedra for co-signature.    Orders Placed This Encounter    miSOPROStol (CYTOTEC) 200 MCG tablet     Sig: Take 3 tablets by oral route x 1 dose night prior to IUD insertion     Dispense:  3 tablet     Refill:  0

## 2025-05-15 NOTE — TELEPHONE ENCOUNTER
----- Message from Yana NELSON sent at 2025  3:58 PM EDT -----  Regarding: FW: Nexplanon Removal & Mirena IUD Insertion  Per instructions Mirena IUD ordered, patient is scheduled for IUD Insertion w/ US Guidance, and 2 weeks after for Nexplanon Removal & IUD Check.   Pt stated discussed with Dr. Piedra medications to be called in for both insertion date and for removal date due to very high anxiety levels, and is Cytotec needed?  ----- Message -----  From: Yana Shah  Sent: 2025   1:47 PM EDT  To: Emani Jacobs RN  Subject: Nexplanon Removal & Mirena IUD Insertion         Tadeo patient called in asking about status of Mirena IUD order have not received a request to order nor received a sign form, please confirm ok to order device as patients Nexplanon is .  Thank you

## 2025-05-16 RX ORDER — DIAZEPAM 5 MG/1
TABLET ORAL
Qty: 1 TABLET | Refills: 0 | Status: SHIPPED | OUTPATIENT
Start: 2025-05-16 | End: 2025-05-16

## 2025-05-23 NOTE — PROGRESS NOTES
Mirena IUD Insertion with US guidance          Name:  Calista Blanco    :  2005 Age:  20 y.o.      Contraceptive method:  Nexplanon  LMP:  No LMP recorded. Patient has had an implant. Previous office note states Nexplanon removal at 2 week string check up  UCG:Neg   IUD Lot # [id number] exp date 2027    Calista Blanco has not had intercourse in the last 2 weeks    Procedure:  Patient was counseled regarding 99% efficacy, risk of irregular bleeding for the first 6 months following insertion, small risk or expulsion or uterine perforation with need for surgical removal.  Consent form is signed.  Vaginal probe U/S done to confirm uterus is anteverted  Speculum was inserted to visualize the cervix.  Cervix was cleansed with zephrine, sterile gloves were donned, and a single-toothed tenaculum applied to the anterior lip of the cervix.  Cervix was dilated and sounded to 8 cm.  Mirena IUD was inserted per the 's instructions.  String was cut at 2 cm from the OS.    Patient tolerated the procedure well.

## 2025-05-27 ENCOUNTER — PROCEDURE VISIT (OUTPATIENT)
Dept: OBGYN CLINIC | Age: 20
End: 2025-05-27
Payer: MEDICAID

## 2025-05-27 ENCOUNTER — OFFICE VISIT (OUTPATIENT)
Dept: OBGYN CLINIC | Age: 20
End: 2025-05-27
Payer: MEDICAID

## 2025-05-27 VITALS
SYSTOLIC BLOOD PRESSURE: 122 MMHG | DIASTOLIC BLOOD PRESSURE: 78 MMHG | WEIGHT: 172.7 LBS | BODY MASS INDEX: 29.49 KG/M2 | HEIGHT: 64 IN

## 2025-05-27 DIAGNOSIS — Z30.430 ENCOUNTER FOR IUD INSERTION: Primary | ICD-10-CM

## 2025-05-27 DIAGNOSIS — Z30.430 ENCOUNTER FOR INSERTION OF MIRENA IUD: Primary | ICD-10-CM

## 2025-05-27 PROCEDURE — 76830 TRANSVAGINAL US NON-OB: CPT | Performed by: OBSTETRICS & GYNECOLOGY

## 2025-05-27 PROCEDURE — 58300 INSERT INTRAUTERINE DEVICE: CPT | Performed by: OBSTETRICS & GYNECOLOGY

## 2025-05-27 RX ORDER — LEVONORGESTREL 52 MG/1
1 INTRAUTERINE DEVICE INTRAUTERINE ONCE
COMMUNITY

## 2025-06-10 ENCOUNTER — OFFICE VISIT (OUTPATIENT)
Dept: OBGYN CLINIC | Age: 20
End: 2025-06-10

## 2025-06-10 VITALS — HEIGHT: 64 IN | BODY MASS INDEX: 29.06 KG/M2 | WEIGHT: 170.2 LBS

## 2025-06-10 DIAGNOSIS — Z30.46 ENCOUNTER FOR SURVEILLANCE OF IMPLANTABLE SUBDERMAL CONTRACEPTIVE: Primary | ICD-10-CM

## 2025-06-10 NOTE — PROGRESS NOTES
The patient is a 20 y.o.  who is seen for IUD string check and Nexplanon Removal (per note on 3/10/25).     Per note on 3/10/25: Pt had Nexplanon placed 22 by . Here for removal today.     Pt is also here for IUD string check. Pt had Mirena IUD inserted under US guidance on 25.     HISTORY:      No LMP recorded (lmp unknown). Patient has had an implant.  Sexual History:  sexually active   Contraception:  Mirena IUD placed 25 by   Current Outpatient Medications on File Prior to Visit   Medication Sig Dispense Refill    levonorgestrel (MIRENA, 52 MG,) IUD 52 mg 1 each by IntraUTERine route once Inserted 25      miSOPROStol (CYTOTEC) 200 MCG tablet Take 3 tablets by oral route x 1 dose night prior to IUD insertion 3 tablet 0    cloNIDine (CATAPRES) 0.1 MG tablet Take 1 tablet by mouth nightly 30 tablet 2    Melatonin Gummies 5 MG CHEW by NOT APPLICABLE route nightly as needed Takes 4 PRN for sleep.  5 mg each gummy to equal 20 mg.      etonogestrel (NEXPLANON) 68 MG implant 68 mg by Subdermal route once      hydrOXYzine HCl (ATARAX) 25 MG tablet Take 1 tablet by mouth 2 times daily as needed for Anxiety 180 tablet 3    escitalopram (LEXAPRO) 10 MG tablet Take 1 tablet by mouth daily 90 tablet 3    clotrimazole-betamethasone (LOTRISONE) 1-0.05 % cream Apply topically 2 times daily. 45 g 5    ondansetron (ZOFRAN) 4 MG tablet Take 1 tablet by mouth 3 times daily as needed for Nausea or Vomiting 15 tablet 0     No current facility-administered medications on file prior to visit.       ROS:  Feeling well. No dyspnea or chest pain on exertion.  No abdominal pain, change in bowel habits, black or bloody stools.  No urinary tract symptoms. GYN ROS: {gyn ros:[id number]}.    PHYSICAL EXAM:  not currently breastfeeding.    The patient appears well, alert, oriented x 3, in no distress.  Lungs are clear. Heart RRR, no murmurs. Abdomen soft without tenderness, guarding, mass or

## 2025-06-10 NOTE — PROGRESS NOTES
The patient is a 20 y.o.  who is seen for IUD string check and Nexplanon Removal (per note on 3/10/25).     Per note on 3/10/25: Pt had Nexplanon placed 22 by . Here for removal today.     Pt refused to have IUD string checked b/c she is on her menses    Past Medical History:   Diagnosis Date    Allergic rhinitis     Anemia, antepartum 2020    Anxiety     Asthma, intermittent     Bipolar 2 disorder (HCC)         Deliberate self-cutting     Depression     Encounter for immunization 2020    History of shoulder dystocia in prior pregnancy     HSV-1 infection     Hypertension     Inhalation injury 2018    Insomnia     Lactose intolerance     Oppositional defiant disorder     [facility] - Dr. Carpenter    Orthostatic hypotension     Pre-eclampsia 2020    with first pregnancy    Rh negative state in antepartum period     Shoulder dystocia during labor and delivery      Family History   Problem Relation Age of Onset    Migraines Maternal Aunt     Diabetes Maternal Aunt     Hypertension Maternal Aunt     Diabetes Brother     Diabetes Maternal Grandmother     Hypertension Maternal Grandmother     Diabetes Maternal Grandfather     High Cholesterol Other     Stroke Other     Heart Disease Other     Diabetes Father     Hypertension Maternal Grandfather     Coronary Art Dis Other      Past Surgical History:   Procedure Laterality Date     SECTION  2021    HEENT      Foreign body removal from right eye     Social History     Socioeconomic History    Marital status: Single     Spouse name: Not on file    Number of children: Not on file    Years of education: Not on file    Highest education level: Not on file   Occupational History    Not on file   Tobacco Use    Smoking status: Every Day     Current packs/day: 1.00     Types: Cigarettes    Smokeless tobacco: Never   Vaping Use    Vaping status: Every Day    Substances: Nicotine, CBD   Substance and Sexual

## 2025-06-19 ENCOUNTER — TELEMEDICINE (OUTPATIENT)
Dept: BEHAVIORAL/MENTAL HEALTH CLINIC | Age: 20
End: 2025-06-19
Payer: MEDICAID

## 2025-06-19 DIAGNOSIS — F43.10 COMPLEX POSTTRAUMATIC STRESS DISORDER: Primary | ICD-10-CM

## 2025-06-19 DIAGNOSIS — F31.81 BIPOLAR II DISORDER (HCC): ICD-10-CM

## 2025-06-19 PROCEDURE — 99214 OFFICE O/P EST MOD 30 MIN: CPT | Performed by: STUDENT IN AN ORGANIZED HEALTH CARE EDUCATION/TRAINING PROGRAM

## 2025-06-19 RX ORDER — ARIPIPRAZOLE 5 MG/1
TABLET ORAL
Qty: 30 TABLET | Refills: 2 | Status: SHIPPED | OUTPATIENT
Start: 2025-06-19

## 2025-06-19 ASSESSMENT — PATIENT HEALTH QUESTIONNAIRE - PHQ9
8. MOVING OR SPEAKING SO SLOWLY THAT OTHER PEOPLE COULD HAVE NOTICED. OR THE OPPOSITE, BEING SO FIGETY OR RESTLESS THAT YOU HAVE BEEN MOVING AROUND A LOT MORE THAN USUAL: SEVERAL DAYS
3. TROUBLE FALLING OR STAYING ASLEEP: NEARLY EVERY DAY
SUM OF ALL RESPONSES TO PHQ QUESTIONS 1-9: 13
SUM OF ALL RESPONSES TO PHQ QUESTIONS 1-9: 13
1. LITTLE INTEREST OR PLEASURE IN DOING THINGS: SEVERAL DAYS
10. IF YOU CHECKED OFF ANY PROBLEMS, HOW DIFFICULT HAVE THESE PROBLEMS MADE IT FOR YOU TO DO YOUR WORK, TAKE CARE OF THINGS AT HOME, OR GET ALONG WITH OTHER PEOPLE: EXTREMELY DIFFICULT
7. TROUBLE CONCENTRATING ON THINGS, SUCH AS READING THE NEWSPAPER OR WATCHING TELEVISION: SEVERAL DAYS
8. MOVING OR SPEAKING SO SLOWLY THAT OTHER PEOPLE COULD HAVE NOTICED. OR THE OPPOSITE - BEING SO FIDGETY OR RESTLESS THAT YOU HAVE BEEN MOVING AROUND A LOT MORE THAN USUAL: SEVERAL DAYS
2. FEELING DOWN, DEPRESSED OR HOPELESS: MORE THAN HALF THE DAYS
3. TROUBLE FALLING OR STAYING ASLEEP: NEARLY EVERY DAY
9. THOUGHTS THAT YOU WOULD BE BETTER OFF DEAD, OR OF HURTING YOURSELF: NOT AT ALL
6. FEELING BAD ABOUT YOURSELF - OR THAT YOU ARE A FAILURE OR HAVE LET YOURSELF OR YOUR FAMILY DOWN: SEVERAL DAYS
SUM OF ALL RESPONSES TO PHQ QUESTIONS 1-9: 13
2. FEELING DOWN, DEPRESSED OR HOPELESS: MORE THAN HALF THE DAYS
7. TROUBLE CONCENTRATING ON THINGS, SUCH AS READING THE NEWSPAPER OR WATCHING TELEVISION: SEVERAL DAYS
SUM OF ALL RESPONSES TO PHQ QUESTIONS 1-9: 13
4. FEELING TIRED OR HAVING LITTLE ENERGY: MORE THAN HALF THE DAYS
10. IF YOU CHECKED OFF ANY PROBLEMS, HOW DIFFICULT HAVE THESE PROBLEMS MADE IT FOR YOU TO DO YOUR WORK, TAKE CARE OF THINGS AT HOME, OR GET ALONG WITH OTHER PEOPLE: EXTREMELY DIFFICULT
6. FEELING BAD ABOUT YOURSELF - OR THAT YOU ARE A FAILURE OR HAVE LET YOURSELF OR YOUR FAMILY DOWN: SEVERAL DAYS
4. FEELING TIRED OR HAVING LITTLE ENERGY: MORE THAN HALF THE DAYS
1. LITTLE INTEREST OR PLEASURE IN DOING THINGS: SEVERAL DAYS
SUM OF ALL RESPONSES TO PHQ QUESTIONS 1-9: 13
5. POOR APPETITE OR OVEREATING: MORE THAN HALF THE DAYS
9. THOUGHTS THAT YOU WOULD BE BETTER OFF DEAD, OR OF HURTING YOURSELF: NOT AT ALL
5. POOR APPETITE OR OVEREATING: MORE THAN HALF THE DAYS

## 2025-06-19 ASSESSMENT — ANXIETY QUESTIONNAIRES
3. WORRYING TOO MUCH ABOUT DIFFERENT THINGS: SEVERAL DAYS
5. BEING SO RESTLESS THAT IT IS HARD TO SIT STILL: SEVERAL DAYS
1. FEELING NERVOUS, ANXIOUS, OR ON EDGE: SEVERAL DAYS
4. TROUBLE RELAXING: NEARLY EVERY DAY
IF YOU CHECKED OFF ANY PROBLEMS ON THIS QUESTIONNAIRE, HOW DIFFICULT HAVE THESE PROBLEMS MADE IT FOR YOU TO DO YOUR WORK, TAKE CARE OF THINGS AT HOME, OR GET ALONG WITH OTHER PEOPLE: EXTREMELY DIFFICULT
3. WORRYING TOO MUCH ABOUT DIFFERENT THINGS: SEVERAL DAYS
1. FEELING NERVOUS, ANXIOUS, OR ON EDGE: SEVERAL DAYS
4. TROUBLE RELAXING: NEARLY EVERY DAY
2. NOT BEING ABLE TO STOP OR CONTROL WORRYING: SEVERAL DAYS
2. NOT BEING ABLE TO STOP OR CONTROL WORRYING: SEVERAL DAYS
6. BECOMING EASILY ANNOYED OR IRRITABLE: MORE THAN HALF THE DAYS
GAD7 TOTAL SCORE: 10
7. FEELING AFRAID AS IF SOMETHING AWFUL MIGHT HAPPEN: SEVERAL DAYS
IF YOU CHECKED OFF ANY PROBLEMS ON THIS QUESTIONNAIRE, HOW DIFFICULT HAVE THESE PROBLEMS MADE IT FOR YOU TO DO YOUR WORK, TAKE CARE OF THINGS AT HOME, OR GET ALONG WITH OTHER PEOPLE: EXTREMELY DIFFICULT
7. FEELING AFRAID AS IF SOMETHING AWFUL MIGHT HAPPEN: SEVERAL DAYS
6. BECOMING EASILY ANNOYED OR IRRITABLE: MORE THAN HALF THE DAYS
5. BEING SO RESTLESS THAT IT IS HARD TO SIT STILL: SEVERAL DAYS

## 2025-06-19 NOTE — PROGRESS NOTES
(LEXAPRO) 10 mg, Oral, DAILY    hydrOXYzine HCl (ATARAX) 25 mg, Oral, 2 TIMES DAILY PRN    levonorgestrel (MIRENA, 52 MG,) IUD 52 mg 1 each, ONCE    Melatonin Gummies 5 MG CHEW NIGHTLY PRN    ondansetron (ZOFRAN) 4 mg, Oral, 3 TIMES DAILY PRN       PDMP:  Last reviewed: 2/16/23    No results in previous 6 mo.    - I have checked the [place] PDMP website prior to prescribing a controlled substance.           Vital Signs:    Temp Readings from Last 3 Encounters:   02/25/25 98.7 °F (37.1 °C) (Oral)   10/21/24 98.2 °F (36.8 °C) (Oral)   06/15/24 98.1 °F (36.7 °C) (Oral)       BP Readings from Last 3 Encounters:   05/27/25 122/78   05/13/25 120/76   03/17/25 116/64       Pulse Readings from Last 3 Encounters:   05/13/25 78   03/17/25 78   02/25/25 99          Lab Results:     Lab Results   Component Value Date/Time    WBC 11.2 06/15/2024 08:41 PM    HGB 14.5 06/15/2024 08:41 PM    HCT 43.7 06/15/2024 08:41 PM    MCV 85.2 06/15/2024 08:41 PM    MCH 28.3 06/15/2024 08:41 PM    MCHC 33.2 06/15/2024 08:41 PM    RDW 13.0 06/15/2024 08:41 PM    MPV 10.9 06/15/2024 08:41 PM    MONOPCT 9 06/15/2024 08:41 PM    EOSPCT 3 06/15/2024 08:41 PM    BASOPCT 1 06/15/2024 08:41 PM    DIFFTYPE AUTOMATED 06/15/2024 08:41 PM         Lab Results   Component Value Date    TRIG 66 09/14/2022    HDL 51 09/14/2022    LDL 72.8 09/14/2022    CHOL 137 09/14/2022    GLUCOSE 97 06/15/2024          All pertinent/available labs reviewed.          Mental Status Examination    General/Appearance: Appears younger, Well-kept, and Appropriately attired    Behavior: cooperative, engaged    Eye Contact: fair    Psychomotor: Within normal limits    Musculoskeletal: gait wnl    Speech/Language: Within normal limits    Mood: \"ok\"    Affect: Appropriate, Congruent, and Restricted range    Thought process: linear, goal directed, and coherent    Thought content: denies SI/HI, A/VH, paranoia or delusions    Orientation: oriented to person, place, and time    Memory:

## 2025-06-26 ENCOUNTER — HOSPITAL ENCOUNTER (EMERGENCY)
Age: 20
Discharge: HOME | End: 2025-06-26
Attending: EMERGENCY MEDICINE
Payer: MEDICAID

## 2025-06-26 VITALS
BODY MASS INDEX: 28.32 KG/M2 | SYSTOLIC BLOOD PRESSURE: 108 MMHG | HEIGHT: 65 IN | OXYGEN SATURATION: 98 % | WEIGHT: 170 LBS | TEMPERATURE: 98.4 F | HEART RATE: 68 BPM | RESPIRATION RATE: 18 BRPM | DIASTOLIC BLOOD PRESSURE: 69 MMHG

## 2025-06-26 DIAGNOSIS — K08.89 PAIN, DENTAL: Primary | ICD-10-CM

## 2025-06-26 PROCEDURE — 99283 EMERGENCY DEPT VISIT LOW MDM: CPT

## 2025-06-26 PROCEDURE — 6370000000 HC RX 637 (ALT 250 FOR IP): Performed by: EMERGENCY MEDICINE

## 2025-06-26 RX ORDER — TRAMADOL HYDROCHLORIDE 50 MG/1
50 TABLET ORAL
Status: COMPLETED | OUTPATIENT
Start: 2025-06-26 | End: 2025-06-26

## 2025-06-26 RX ADMIN — TRAMADOL HYDROCHLORIDE 50 MG: 50 TABLET, COATED ORAL at 01:55

## 2025-06-26 ASSESSMENT — ENCOUNTER SYMPTOMS
COUGH: 0
SHORTNESS OF BREATH: 0

## 2025-06-26 ASSESSMENT — PAIN SCALES - GENERAL: PAINLEVEL_OUTOF10: 8

## 2025-06-26 ASSESSMENT — PAIN - FUNCTIONAL ASSESSMENT: PAIN_FUNCTIONAL_ASSESSMENT: 0-10

## 2025-06-26 ASSESSMENT — PAIN DESCRIPTION - LOCATION: LOCATION: TEETH

## 2025-06-26 NOTE — ED PROVIDER NOTES
Emergency Department Provider Note       PCP: Kim Vance MD   Age: 20 y.o.   Sex: female     DISPOSITION Discharge - Pending Orders Complete 06/26/2025 01:47:54 AM   DISPOSITION CONDITION Stable            ICD-10-CM    1. Pain, dental  K08.89           Medical Decision Making     I will give her a dose of some Ultram and prescribe some diclofenac.  I do not see any obvious evidence for abscess.  I will encourage her to follow-up with her dentist.     1 acute complicated illness or injury.  Prescription drug management performed.  Shared medical decision making was utilized in creating the patients health plan today.    I independently ordered and reviewed each unique test.                     History     20-year-old lady presents with concerns about pain in her right upper incisor.  She says she had it sealed her A couple of weeks ago and now it is very uncomfortable especially if she tries to chew.  She denies any redness or swelling.  She has had no fevers.    She has tried some ibuprofen without any relief.    No other associated symptoms.    Elements of this note were created using speech recognition software.  As such, errors of speech recognition may be present.        ROS     Review of Systems   Constitutional:  Negative for chills.   HENT:  Positive for dental problem. Negative for mouth sores.    Respiratory:  Negative for cough and shortness of breath.         Physical Exam     Vitals signs and nursing note reviewed:  Vitals:    06/26/25 0138   BP: (!) 120/90   Pulse: 82   Resp: 18   TempSrc: Oral   SpO2: 96%   Weight: 77.1 kg (170 lb)   Height: 1.651 m (5' 5\")      Physical Exam  Constitutional:       Appearance: Normal appearance.   HENT:      Mouth/Throat:      Comments: Good overall oral hygiene.  No obvious abscess or gum induration.  Neurological:      Mental Status: She is alert.        Procedures     Procedures    No orders of the defined types were placed in this encounter.

## 2025-06-26 NOTE — DISCHARGE INSTRUCTIONS
Please return with any fevers, redness, swelling, worsening symptoms, or additional concerns.    Please follow-up with your dentist for reevaluation.

## 2025-07-11 ENCOUNTER — TELEPHONE (OUTPATIENT)
Dept: FAMILY MEDICINE CLINIC | Facility: CLINIC | Age: 20
End: 2025-07-11

## 2025-07-11 NOTE — TELEPHONE ENCOUNTER
----- Message from Jessica LB sent at 7/10/2025  3:43 PM EDT -----  Regarding: [facility] Appointment Request  [facility] Appointment Request    Patient needs appointment for [facility] Appointment Type: Existing Condition Follow Up.    Patient Requested Dates(s): the soonest appt or any cancellation  Patient Requested Time: morning   Provider Name: Kim Vance MD     Reason for Appointment Request: Other, pt has an appt on 7/22/2025 with Kim Vance MD however pt would like to get a sooner appt and would like to grab any cancellation.  --------------------------------------------------------------------------------------------------------------------------    Relationship to Patient: Self     Call Back Information: OK to leave message on voicemail  Preferred Call Back Number: Phone  [phone]

## 2025-07-11 NOTE — TELEPHONE ENCOUNTER
Patient's mother Ayala Blanco (On comm release) was contacted and she stated that the patient was trying to get the message to Dr. Mccabe, not Dr. Vance.

## 2025-07-17 ENCOUNTER — TELEPHONE (OUTPATIENT)
Dept: FAMILY MEDICINE CLINIC | Facility: CLINIC | Age: 20
End: 2025-07-17

## 2025-07-17 DIAGNOSIS — F43.10 COMPLEX POSTTRAUMATIC STRESS DISORDER: Primary | ICD-10-CM

## 2025-07-17 RX ORDER — CLONIDINE HYDROCHLORIDE 0.1 MG/1
0.1 TABLET ORAL 2 TIMES DAILY
Qty: 60 TABLET | Refills: 3 | Status: SHIPPED | OUTPATIENT
Start: 2025-07-17

## 2025-07-17 NOTE — TELEPHONE ENCOUNTER
Patient called stated that she started to retake this medication and it is now working for her and would like a re-fill:  cloNIDine (CATAPRES) 0.1 MG tablet [[id number]]  DISCONTINUED    Order Details  Dose: 0.1 mg Route: Oral Frequency: EVERY BEDTIME   Dispense Quantity: 30 tablet Refills: 2          Sig: Take 1 tablet by mouth nightly         Start Date: 05/13/25 End Date: 06/19/25   Discontinued by: Igor Contreras MD on 6/19/2025 14:30

## 2025-07-26 ENCOUNTER — HOSPITAL ENCOUNTER (EMERGENCY)
Age: 20
Discharge: HOME | End: 2025-07-26
Attending: EMERGENCY MEDICINE
Payer: MEDICAID

## 2025-07-26 VITALS
DIASTOLIC BLOOD PRESSURE: 66 MMHG | HEART RATE: 93 BPM | BODY MASS INDEX: 27.99 KG/M2 | WEIGHT: 168 LBS | HEIGHT: 65 IN | SYSTOLIC BLOOD PRESSURE: 99 MMHG | OXYGEN SATURATION: 93 % | TEMPERATURE: 98 F | RESPIRATION RATE: 15 BRPM

## 2025-07-26 DIAGNOSIS — N89.8 VAGINAL DISCHARGE: ICD-10-CM

## 2025-07-26 DIAGNOSIS — Z20.2 STD EXPOSURE: Primary | ICD-10-CM

## 2025-07-26 LAB
APPEARANCE UR: CLEAR
BILIRUB UR QL: NEGATIVE
COLOR UR: NORMAL
GLUCOSE UR STRIP.AUTO-MCNC: NEGATIVE MG/DL
HCG UR QL: NEGATIVE
HGB UR QL STRIP: NEGATIVE
KETONES UR QL STRIP.AUTO: NEGATIVE MG/DL
LEUKOCYTE ESTERASE UR QL STRIP.AUTO: NEGATIVE
NITRITE UR QL STRIP.AUTO: NEGATIVE
PH UR STRIP: 6 (ref 5–9)
PROT UR STRIP-MCNC: NEGATIVE MG/DL
SERVICE CMNT-IMP: NORMAL
SP GR UR REFRACTOMETRY: 1.01 (ref 1–1.02)
UROBILINOGEN UR QL STRIP.AUTO: 1 EU/DL (ref 0.2–1)
WET PREP GENITAL: NORMAL

## 2025-07-26 PROCEDURE — 87591 N.GONORRHOEAE DNA AMP PROB: CPT

## 2025-07-26 PROCEDURE — 81025 URINE PREGNANCY TEST: CPT

## 2025-07-26 PROCEDURE — 99284 EMERGENCY DEPT VISIT MOD MDM: CPT

## 2025-07-26 PROCEDURE — 81003 URINALYSIS AUTO W/O SCOPE: CPT

## 2025-07-26 PROCEDURE — 96372 THER/PROPH/DIAG INJ SC/IM: CPT

## 2025-07-26 PROCEDURE — 6360000002 HC RX W HCPCS: Performed by: EMERGENCY MEDICINE

## 2025-07-26 PROCEDURE — 87210 SMEAR WET MOUNT SALINE/INK: CPT

## 2025-07-26 PROCEDURE — 87491 CHLMYD TRACH DNA AMP PROBE: CPT

## 2025-07-26 RX ORDER — DOXYCYCLINE HYCLATE 100 MG
100 TABLET ORAL 2 TIMES DAILY
Qty: 28 TABLET | Refills: 0 | Status: SHIPPED | OUTPATIENT
Start: 2025-07-26 | End: 2025-08-09

## 2025-07-26 RX ADMIN — LIDOCAINE HYDROCHLORIDE 500 MG: 10 INJECTION, SOLUTION INFILTRATION; PERINEURAL at 03:45

## 2025-07-26 ASSESSMENT — PAIN - FUNCTIONAL ASSESSMENT
PAIN_FUNCTIONAL_ASSESSMENT: NONE - DENIES PAIN
PAIN_FUNCTIONAL_ASSESSMENT: NONE - DENIES PAIN

## 2025-07-26 NOTE — ED PROVIDER NOTES
Emergency Department Provider Note       [facility] EMERGENCY DEPT   PCP: Kim Vance MD   Age: 20 y.o.   Sex: female     DISPOSITION Discharge - Pending Orders Complete 07/26/2025 03:40:56 AM    ICD-10-CM    1. STD exposure  Z20.2       2. Vaginal discharge  N89.8           Medical Decision Making     Pregnancy test is negative.  Swabs shows white blood cells but no trichomonas yeast or clue cells.  IM Rocephin to cover for gonorrhea and p.o. Doxy for 14 days to cover for both chlamydia and syphilis.     1 acute complicated illness or injury.  Prescription drug management performed.  Shared medical decision making was utilized in creating the patients health plan today.  I independently ordered and reviewed each unique test.                         History     Vaginal discharge and a phone call from a recent sexual partner informing her she needed to get tested for gonorrhea and chlamydia.    The history is provided by the patient and a parent.     Physical Exam     Vitals signs and nursing note reviewed:  Vitals:    07/26/25 0227   BP: 115/71   Pulse: (!) 109   Resp: 19   Temp: 98.3 °F (36.8 °C)   TempSrc: Oral   SpO2: 97%   Weight: 76.2 kg (168 lb)   Height: 1.651 m (5' 5\")      Physical Exam  Vitals and nursing note reviewed.   Constitutional:       General: She is not in acute distress.     Appearance: She is not ill-appearing.   Cardiovascular:      Rate and Rhythm: Normal rate and regular rhythm.      Heart sounds: Normal heart sounds.   Pulmonary:      Effort: Pulmonary effort is normal.      Breath sounds: Normal breath sounds.   Neurological:      Mental Status: She is alert.          Procedures     Procedures    Orders placed during this emergency department visit:     Orders Placed This Encounter   Procedures    C.trachomatis N.gonorrhoeae DNA    Wet prep, genital    Urinalysis (If Dip is Positive    Pregnancy, Urine (Lab)  “IF” female of childbearing age. (10 - 55 years of age)        Medications

## 2025-07-26 NOTE — ED TRIAGE NOTES
Patient verified by name and  prior to triage. Pt presents to the ER with steady gait.  Pt accompianed by self.  Pt and/or family reports being told by a partner that she needs to be tested for GC and chlamydia.  Pt reports gray colored vaginal discharge.

## 2025-07-26 NOTE — DISCHARGE INSTRUCTIONS
Follow-up with your doctor in 1 week if not improving for recheck.  No intercourse until your treatment is complete.  No intercourse with the most recent partners until they are evaluated and treated as well.  Return if any new, worsening or concerning symptoms.  Take doxycycline twice daily for 2 weeks until complete

## 2025-07-29 LAB
C TRACH RRNA SPEC QL NAA+PROBE: NEGATIVE
N GONORRHOEA RRNA SPEC QL NAA+PROBE: NEGATIVE
SPECIMEN SOURCE: NORMAL

## (undated) DEVICE — TRAY CATH 16FR PVC INTMIT STR TIP PREATTACH TO 1000ML COLL

## (undated) DEVICE — PREMIUM WET SKIN PREP TRAY: Brand: MEDLINE INDUSTRIES, INC.

## (undated) DEVICE — MEDI-VAC NON-CONDUCTIVE SUCTION TUBING: Brand: CARDINAL HEALTH

## (undated) DEVICE — SUTURE PLN GUT SZ 2-0 L27IN ABSRB YELLOWISH TAN L40MM CT 853H

## (undated) DEVICE — SUTURE VCRL SZ 0 L36IN ABSRB UD L36MM CT-1 1/2 CIR J946H

## (undated) DEVICE — Device: Brand: PORTEX

## (undated) DEVICE — STERILE POLYISOPRENE POWDER-FREE SURGICAL GLOVES: Brand: PROTEXIS

## (undated) DEVICE — KENDALL SCD EXPRESS SLEEVES, KNEE LENGTH, MEDIUM: Brand: KENDALL SCD

## (undated) DEVICE — PENCIL ES L3M BTTN SWCH S STL HEX LOK BLDE ELECTRD HOLSTER

## (undated) DEVICE — SUTURE MCRYL SZ 3-0 L27IN ABSRB UD L60MM KS STR REV CUT Y523H

## (undated) DEVICE — AMD ANTIMICROBIAL NON-ADHERENT PAD,0.2% POLYHEXAMETHYLENE BIGUANIDE HCI (PHMB): Brand: TELFA

## (undated) DEVICE — SUTURE VCRL SZ 2-0 L36IN ABSRB VLT L36MM CT-1 1/2 CIR J345H

## (undated) DEVICE — SOLUTION IV 1000ML 0.9% SOD CHL

## (undated) DEVICE — AMD ANTIMICROBIAL GAUZE SPONGES,12 PLY USP TYPE VII, 0.2% POLYHEXAMETHYLENE BIGUANIDE HCI (PHMB): Brand: CURITY

## (undated) DEVICE — SOLUTION IRRIG 1000ML H2O STRL BLT

## (undated) DEVICE — SURGICAL PROCEDURE PACK C SECT CDS

## (undated) DEVICE — REM POLYHESIVE ADULT PATIENT RETURN ELECTRODE: Brand: VALLEYLAB